# Patient Record
Sex: FEMALE | Race: WHITE | NOT HISPANIC OR LATINO | Employment: UNEMPLOYED | URBAN - METROPOLITAN AREA
[De-identification: names, ages, dates, MRNs, and addresses within clinical notes are randomized per-mention and may not be internally consistent; named-entity substitution may affect disease eponyms.]

---

## 2017-03-15 ENCOUNTER — TRANSFERRED RECORDS (OUTPATIENT)
Dept: HEALTH INFORMATION MANAGEMENT | Facility: CLINIC | Age: 42
End: 2017-03-15

## 2017-03-15 ENCOUNTER — OFFICE VISIT (OUTPATIENT)
Dept: OBGYN | Facility: CLINIC | Age: 42
End: 2017-03-15
Payer: COMMERCIAL

## 2017-03-15 VITALS
DIASTOLIC BLOOD PRESSURE: 66 MMHG | HEIGHT: 62 IN | BODY MASS INDEX: 26.87 KG/M2 | WEIGHT: 146 LBS | SYSTOLIC BLOOD PRESSURE: 100 MMHG | HEART RATE: 66 BPM

## 2017-03-15 DIAGNOSIS — Z13.6 CARDIOVASCULAR SCREENING; LDL GOAL LESS THAN 130: ICD-10-CM

## 2017-03-15 DIAGNOSIS — Z12.39 SCREENING FOR BREAST CANCER: ICD-10-CM

## 2017-03-15 DIAGNOSIS — Z13.1 SCREENING FOR DIABETES MELLITUS: ICD-10-CM

## 2017-03-15 DIAGNOSIS — Z01.419 ENCOUNTER FOR GYNECOLOGICAL EXAMINATION WITHOUT ABNORMAL FINDING: Primary | ICD-10-CM

## 2017-03-15 LAB
CHOLEST SERPL-MCNC: 213 MG/DL
GLUCOSE BLD-MCNC: 92 MG/DL (ref 70–99)
HDLC SERPL-MCNC: 71 MG/DL
LDLC SERPL CALC-MCNC: 133 MG/DL
NONHDLC SERPL-MCNC: 142 MG/DL
TRIGL SERPL-MCNC: 44 MG/DL

## 2017-03-15 PROCEDURE — 87624 HPV HI-RISK TYP POOLED RSLT: CPT | Performed by: PHYSICIAN ASSISTANT

## 2017-03-15 PROCEDURE — 99396 PREV VISIT EST AGE 40-64: CPT | Performed by: PHYSICIAN ASSISTANT

## 2017-03-15 PROCEDURE — G0145 SCR C/V CYTO,THINLAYER,RESCR: HCPCS | Performed by: PHYSICIAN ASSISTANT

## 2017-03-15 PROCEDURE — 36415 COLL VENOUS BLD VENIPUNCTURE: CPT | Performed by: PHYSICIAN ASSISTANT

## 2017-03-15 PROCEDURE — 82947 ASSAY GLUCOSE BLOOD QUANT: CPT | Performed by: PHYSICIAN ASSISTANT

## 2017-03-15 PROCEDURE — 80061 LIPID PANEL: CPT | Performed by: PHYSICIAN ASSISTANT

## 2017-03-15 ASSESSMENT — ANXIETY QUESTIONNAIRES
2. NOT BEING ABLE TO STOP OR CONTROL WORRYING: NOT AT ALL
GAD7 TOTAL SCORE: 0
1. FEELING NERVOUS, ANXIOUS, OR ON EDGE: NOT AT ALL
6. BECOMING EASILY ANNOYED OR IRRITABLE: NOT AT ALL
5. BEING SO RESTLESS THAT IT IS HARD TO SIT STILL: NOT AT ALL
3. WORRYING TOO MUCH ABOUT DIFFERENT THINGS: NOT AT ALL
7. FEELING AFRAID AS IF SOMETHING AWFUL MIGHT HAPPEN: NOT AT ALL

## 2017-03-15 ASSESSMENT — PATIENT HEALTH QUESTIONNAIRE - PHQ9: 5. POOR APPETITE OR OVEREATING: NOT AT ALL

## 2017-03-15 NOTE — MR AVS SNAPSHOT
After Visit Summary   3/15/2017    Khloe Smith    MRN: [id number]           Patient Information     Date Of Birth          1975        Visit Information        Provider Department      3/15/2017 9:50 AM Tatiana Gilliam PA-C [facility]        Today's Diagnoses     Encounter for gynecological examination without abnormal finding    -  1    Screening for breast cancer        CARDIOVASCULAR SCREENING; LDL GOAL LESS THAN 130        Screening for diabetes mellitus           Follow-ups after your visit        Additional Services     RADIOLOGY REFERRAL ([facility])       You have been referred to [facility] for Mammogram (Screening) Imaging.  They are located across the [place] from the [facility] (same building).  [address], [address]  Phone: [phone].                  Follow-up notes from your care team     Return in about 1 year (around 3/15/2018).      Who to contact     If you have questions or need follow up information about today's clinic visit or your schedule please contact [facility] directly at [phone].  Normal or non-critical lab and imaging results will be communicated to you by zeeWAVEShart, letter or phone within 4 business days after the clinic has received the results. If you do not hear from us within 7 days, please contact the clinic through zeeWAVEShart or phone. If you have a critical or abnormal lab result, we will notify you by phone as soon as possible.  Submit refill requests through Rubysophic or call your pharmacy and they will forward the refill request to us. Please allow 3 business days for your refill to be completed.          Additional Information About Your Visit        MyChart Information     Rubysophic gives you secure access to your electronic health record. If you see a primary care provider, you can also send messages to your care team and make appointments. If you have questions,  "please call your primary care clinic.  If you do not have a primary care provider, please call [phone] and they will assist you.        Care EveryWhere ID     This is your Care EveryWhere ID. This could be used by other organizations to access your [place] medical records  [id number]        Your Vitals Were     Pulse Height Last Period Breastfeeding? BMI (Body Mass Index)       66 5' 2\" (1.575 m) 03/06/2017 (Exact Date) No 26.7 kg/m2        Blood Pressure from Last 3 Encounters:   03/15/17 100/66   12/11/16 97/83   12/09/16 106/60    Weight from Last 3 Encounters:   03/15/17 146 lb (66.2 kg)   12/11/16 140 lb (63.5 kg)   12/09/16 142 lb (64.4 kg)              We Performed the Following     Glucose, whole blood     HPV High Risk Types DNA Cervical     Lipid panel reflex to direct LDL     Pap imaged thin layer screen with HPV - recommended age 30 - 65     RADIOLOGY REFERRAL ([facility])        Primary Care Provider Office Phone # Fax #    Jasson Barry [phone]       [place] [address]        Thank you!     Thank you for choosing [facility]  for your care. Our goal is always to provide you with excellent care. Hearing back from our patients is one way we can continue to improve our services. Please take a few minutes to complete the written survey that you may receive in the mail after your visit with us. Thank you!             Your Updated Medication List - Protect others around you: Learn how to safely use, store and throw away your medicines at www.disposemymeds.org.          This list is accurate as of: 3/15/17 10:32 AM.  Always use your most recent med list.                   Brand Name Dispense Instructions for use    metFORMIN 850 MG tablet    GLUCOPHAGE    180 tablet    Take 1 tablet (850 mg) by mouth 2 times daily (with meals)       MULTIPLE VITAMIN PO          VITAMIN B-12 PO            "

## 2017-03-15 NOTE — PROGRESS NOTES
Khloe is a 41 year old  female who presents for annual exam.     Besides routine health maintenance, she has no other health concerns today .    HPI:  The patient's PCP is Jasson Barry MD.      Doing well. Fasting today for lipids, last year LDL was slightly high. Has been back in a good exercise routine and eating healthier this year.     Having mammogram at [facility] after visit, ok coming here next year for mammogram.   Pap due this year.     Kids are all good: oldest is 16, twins are 14 and triplets are 11 yrs.     Periods regular, monthly.   No bladder issues.   Moods are generally good.     This past year, started having right sided numbness, leg and arm. Still has right lower leg numbness. Has had full work up by neurology and thus far, testing is negative for MS and no other etiologies have been found. Her symptoms have not progressed and are slowly improving. No pain or weakness, so does not really bother her. Will continue to see neurology every 4 months for time being.     GYNECOLOGIC HISTORY:    Patient's last menstrual period was 2017 (exact date).  Her current contraception method is: vasectomy.  She  reports that she has never smoked. She has never used smokeless tobacco.    Patient is sexually active.  STD testing offered?  Declined  Last PHQ-9 score on record =   PHQ-9 SCORE 3/15/2017   Total Score 0     Last GAD7 score on record =   DALIA-7 SCORE 3/15/2017   Total Score 0     Alcohol Score = 0    HEALTH MAINTENANCE:  Cholesterol: 3/14/16   Total= 221, Triglycerides=54, HDL=73, GPR=693, FBS=79 -patient is fasting  Last Mammo: 3/14/16, Result: normal, Next Mammo: today @ [facility]  Pap: 12 neg, HPV-  Colonoscopy: NA  Dexa:  never    Health maintenance updated:  yes    HISTORY:  Obstetric History       T1      TAB0   SAB1   E0   M2   L6       # Outcome Date GA Lbr Mckay/2nd Weight Sex Delivery Anes PTL Lv   4A  2006           4B             3A        "      3B  2003           3C  2003           2 Term 2001           1 SAB                   Patient Active Problem List   Diagnosis     PCOS (polycystic ovarian syndrome)     CARDIOVASCULAR SCREENING; LDL GOAL LESS THAN 130     Past Surgical History   Procedure Laterality Date     Pylorotomy       Appendectomy open        section  ,      Knee surgery Left 2009     Dilation and curettage        Social History   Substance Use Topics     Smoking status: Never Smoker     Smokeless tobacco: Never Used     Alcohol use No      Problem (# of Occurrences) Relation (Name,Age of Onset)    Colon Cancer (1) Paternal Grandfather    DIABETES (1) Mother    Hyperlipidemia (1) Father            Current Outpatient Prescriptions   Medication Sig     MULTIPLE VITAMIN PO      Cyanocobalamin (VITAMIN B-12 PO)      metFORMIN (GLUCOPHAGE) 850 MG tablet Take 1 tablet (850 mg) by mouth 2 times daily (with meals)     No current facility-administered medications for this visit.      Allergies   Allergen Reactions     Sulfa Drugs Rash       Past medical, surgical, social and family histories were reviewed and updated in EPIC.    ROS:   12 point review of systems negative other than symptoms noted below.    EXAM:  /66  Pulse 66  Ht 5' 2\" (1.575 m)  Wt 146 lb (66.2 kg)  LMP 2017 (Exact Date)  Breastfeeding? No  BMI 26.7 kg/m2   BMI: Body mass index is 26.7 kg/(m^2).    PHYSICAL EXAM:  Constitutional:  Appearance: Well nourished, well developed, alert, in no acute distress  Neck:  Lymph Nodes:  No lymphadenopathy present    Thyroid:  Gland size normal, nontender, no nodules or masses present  on palpation  Chest:  Respiratory Effort:  Breathing unlabored  Cardiovascular:    Heart: Auscultation:  Regular rate, normal rhythm, no murmurs present  Breasts: Inspection of Breasts:  No lymphadenopathy present    Palpation of Breasts and Axillae:  No masses present on palpation, no  breast " tenderness    Axillary Lymph Nodes:  No lymphadenopathy present  Gastrointestinal:   Abdominal Examination:  Abdomen nontender to palpation, tone normal without rigidity or guarding, no masses present, umbilicus without lesions   Liver and Spleen:  No hepatomegaly present, liver nontender to palpation    Hernias:  No hernias present  Lymphatic: Lymph Nodes:  No other lymphadenopathy present  Skin:  General Inspection:  No rashes present, no lesions present, no areas of  discoloration    Genitalia and Groin:  No rashes present, no lesions present, no areas of  discoloration, no masses present  Neurologic/Psychiatric:    Mental Status:  Oriented X3     Pelvic Exam:  External Genitalia:     Normal appearance for age, no discharge present, no tenderness present, no inflammatory lesions present, color normal  Vagina:     Normal vaginal vault without central or paravaginal defects, no discharge present, no inflammatory lesions present, no masses present  Bladder:     Nontender to palpation  Urethra:   Urethral Body:  Urethra palpation normal, urethra structural support normal   Urethral Meatus:  No erythema or lesions present  Cervix:     Appearance healthy, no lesions present, nontender to palpation, no bleeding present  Uterus:     Nontender to palpation, no masses present, position anteflexed, mobility: normal  Adnexa:     No adnexal tenderness present, no adnexal masses present  Perineum:     Perineum within normal limits, no evidence of trauma, no rashes or skin lesions present  Anus:     Anus within normal limits, no hemorrhoids present  Inguinal Lymph Nodes:     No lymphadenopathy present  Pubic Hair:     Normal pubic hair distribution for age  Genitalia and Groin:     No rashes present, no lesions present, no areas of discoloration, no masses present    COUNSELING:   Reviewed preventive health counseling, as reflected in patient instructions    BMI: Body mass index is 26.7 kg/(m^2).  Weight management plan:  Discussed healthy diet and exercise guidelines and patient will follow up in 12 months in clinic to re-evaluate.    ASSESSMENT:  41 year old female with satisfactory annual exam.    ICD-10-CM    1. Encounter for gynecological examination without abnormal finding Z01.419 Pap imaged thin layer screen with HPV - recommended age 30 - 65     HPV High Risk Types DNA Cervical   2. Screening for breast cancer Z12.39 RADIOLOGY REFERRAL ([facility])   3. CARDIOVASCULAR SCREENING; LDL GOAL LESS THAN 130 Z13.6 Lipid panel reflex to direct LDL   4. Screening for diabetes mellitus Z13.1 Glucose, whole blood       PLAN:  Follow up with your primary care provider for your other medical problems.  Continue self breast exam.  PHQ-9/DALIA-7 scores were discussed.  Alcohol score discussed.  Lab and pap smear results will be called to the patient.  Usual safety and preventative measures counseling done.      Tatiana Gilliam PA-C

## 2017-03-16 ASSESSMENT — ANXIETY QUESTIONNAIRES: GAD7 TOTAL SCORE: 0

## 2017-03-16 ASSESSMENT — PATIENT HEALTH QUESTIONNAIRE - PHQ9: SUM OF ALL RESPONSES TO PHQ QUESTIONS 1-9: 0

## 2017-03-18 LAB
COPATH REPORT: NORMAL
PAP: NORMAL

## 2017-03-21 LAB
FINAL DIAGNOSIS: NORMAL
HPV HR 12 DNA CVX QL NAA+PROBE: NEGATIVE
HPV16 DNA SPEC QL NAA+PROBE: NEGATIVE
HPV18 DNA SPEC QL NAA+PROBE: NEGATIVE
SPECIMEN DESCRIPTION: NORMAL

## 2018-03-06 DIAGNOSIS — E28.2 PCOS (POLYCYSTIC OVARIAN SYNDROME): ICD-10-CM

## 2018-03-06 DIAGNOSIS — Z01.419 ENCOUNTER FOR GYNECOLOGICAL EXAMINATION WITHOUT ABNORMAL FINDING: ICD-10-CM

## 2018-03-06 NOTE — TELEPHONE ENCOUNTER
metFORMIN (GLUCOPHAGE) 850 MG tablet     Last Written Prescription Date:  3/20/17  Last Fill Quantity: 180,   # refills: 3  Last Office Visit with [facility] primary care provider:  3/5/17  Future Office visit: 3/19/18    Routing refill request to provider for review/approval because:  Refill sent.

## 2018-03-19 ENCOUNTER — RADIANT APPOINTMENT (OUTPATIENT)
Dept: MAMMOGRAPHY | Facility: CLINIC | Age: 43
End: 2018-03-19
Payer: COMMERCIAL

## 2018-03-19 ENCOUNTER — OFFICE VISIT (OUTPATIENT)
Dept: OBGYN | Facility: CLINIC | Age: 43
End: 2018-03-19
Payer: COMMERCIAL

## 2018-03-19 VITALS
BODY MASS INDEX: 29.08 KG/M2 | HEIGHT: 62 IN | WEIGHT: 158 LBS | HEART RATE: 62 BPM | SYSTOLIC BLOOD PRESSURE: 104 MMHG | DIASTOLIC BLOOD PRESSURE: 62 MMHG

## 2018-03-19 DIAGNOSIS — E28.2 PCOS (POLYCYSTIC OVARIAN SYNDROME): ICD-10-CM

## 2018-03-19 DIAGNOSIS — Z12.31 VISIT FOR SCREENING MAMMOGRAM: ICD-10-CM

## 2018-03-19 DIAGNOSIS — Z01.419 ENCOUNTER FOR GYNECOLOGICAL EXAMINATION WITHOUT ABNORMAL FINDING: Primary | ICD-10-CM

## 2018-03-19 PROCEDURE — 99396 PREV VISIT EST AGE 40-64: CPT | Performed by: OBSTETRICS & GYNECOLOGY

## 2018-03-19 PROCEDURE — 77063 BREAST TOMOSYNTHESIS BI: CPT | Mod: TC

## 2018-03-19 PROCEDURE — 77067 SCR MAMMO BI INCL CAD: CPT | Mod: TC

## 2018-03-19 ASSESSMENT — ANXIETY QUESTIONNAIRES
3. WORRYING TOO MUCH ABOUT DIFFERENT THINGS: NOT AT ALL
6. BECOMING EASILY ANNOYED OR IRRITABLE: NOT AT ALL
1. FEELING NERVOUS, ANXIOUS, OR ON EDGE: NOT AT ALL
IF YOU CHECKED OFF ANY PROBLEMS ON THIS QUESTIONNAIRE, HOW DIFFICULT HAVE THESE PROBLEMS MADE IT FOR YOU TO DO YOUR WORK, TAKE CARE OF THINGS AT HOME, OR GET ALONG WITH OTHER PEOPLE: NOT DIFFICULT AT ALL
5. BEING SO RESTLESS THAT IT IS HARD TO SIT STILL: NOT AT ALL
7. FEELING AFRAID AS IF SOMETHING AWFUL MIGHT HAPPEN: NOT AT ALL
2. NOT BEING ABLE TO STOP OR CONTROL WORRYING: NOT AT ALL
GAD7 TOTAL SCORE: 0

## 2018-03-19 ASSESSMENT — PATIENT HEALTH QUESTIONNAIRE - PHQ9: 5. POOR APPETITE OR OVEREATING: NOT AT ALL

## 2018-03-19 NOTE — MR AVS SNAPSHOT
"              After Visit Summary   3/19/2018    Khloe Smith    MRN: [id number]           Patient Information     Date Of Birth          1975        Visit Information        Provider Department      3/19/2018 8:30 AM Martha Meek MD [facility]        Today's Diagnoses     Encounter for gynecological examination without abnormal finding    -  1    PCOS (polycystic ovarian syndrome)           Follow-ups after your visit        Who to contact     If you have questions or need follow up information about today's clinic visit or your schedule please contact [facility] directly at [phone].  Normal or non-critical lab and imaging results will be communicated to you by Wantreez Musichart, letter or phone within 4 business days after the clinic has received the results. If you do not hear from us within 7 days, please contact the clinic through WatchDoxt or phone. If you have a critical or abnormal lab result, we will notify you by phone as soon as possible.  Submit refill requests through Dragonfly or call your pharmacy and they will forward the refill request to us. Please allow 3 business days for your refill to be completed.          Additional Information About Your Visit        MyChart Information     Dragonfly gives you secure access to your electronic health record. If you see a primary care provider, you can also send messages to your care team and make appointments. If you have questions, please call your primary care clinic.  If you do not have a primary care provider, please call [phone] and they will assist you.        Care EveryWhere ID     This is your Care EveryWhere ID. This could be used by other organizations to access your [place] medical records  [id number]        Your Vitals Were     Pulse Height Last Period BMI (Body Mass Index)          62 5' 2\" (1.575 m) 03/14/2018 (Exact Date) 28.9 kg/m2         Blood Pressure from Last 3 Encounters:   03/19/18 " 104/62   03/15/17 100/66   12/11/16 97/83    Weight from Last 3 Encounters:   03/19/18 158 lb (71.7 kg)   03/15/17 146 lb (66.2 kg)   12/11/16 140 lb (63.5 kg)              Today, you had the following     No orders found for display         Today's Medication Changes          These changes are accurate as of 3/19/18  9:36 AM.  If you have any questions, ask your nurse or doctor.               These medicines have changed or have updated prescriptions.        Dose/Directions    metFORMIN 850 MG tablet   Commonly known as:  GLUCOPHAGE   This may have changed:  See the new instructions.   Used for:  PCOS (polycystic ovarian syndrome)   Changed by:  Martha Meek MD        TAKE 1 TABLET TWICE DAILY  WITH MEALS   Quantity:  180 tablet   Refills:  3            Where to get your medicines      These medications were sent to [facility] - [place], [place] - [address] AT [address] to [address], [facility] [address]     Phone:  [phone]     metFORMIN 850 MG tablet                Primary Care Provider Office Phone # Fax #    Jassonmanuel Barry [phone]       [place] [address]        Equal Access to Services     DANIAL TA AH: Hadii vita leon hadasho Sotomali, waaxda luqadaha, qaybta kaalmada adeegyada, waxay corina jones. So [facility] [phone].    ATENCIÓN: Si habla español, tiene a raines disposición servicios gratuitos de asistencia lingüística. Llame al [phone].    We comply with applicable federal civil rights laws and [place] laws. We do not discriminate on the basis of race, color, national origin, age, disability, sex, sexual orientation, or gender identity.            Thank you!     Thank you for choosing [facility]  for your care. Our goal is always to provide you with excellent care. Hearing back from our patients is one way we can continue to  improve our services. Please take a few minutes to complete the written survey that you may receive in the mail after your visit with us. Thank you!             Your Updated Medication List - Protect others around you: Learn how to safely use, store and throw away your medicines at www.disposemymeds.org.          This list is accurate as of 3/19/18  9:36 AM.  Always use your most recent med list.                   Brand Name Dispense Instructions for use Diagnosis    metFORMIN 850 MG tablet    GLUCOPHAGE    180 tablet    TAKE 1 TABLET TWICE DAILY  WITH MEALS    PCOS (polycystic ovarian syndrome)       MULTIPLE VITAMIN PO           VITAMIN B-12 PO

## 2018-03-19 NOTE — PROGRESS NOTES
"  Khloe is a 42 year old  female who presents for annual exam.     Besides routine health maintenance, she has no other health concerns today .    HPI:  The patient's PCP is Jasson Barry MD.    Patient is doing great. Does have Dr. Barry as her PCP but is mostly just seeing us    Has been on metformin for years with her PCOS. Periods were very irregular when got pregnant with her kids but now are actually more regular than they ever were. Will start with a day of bleeding and then have a few days without and then will start again. That is the most irregular part. The actual several days in a row of flow are fairly monthly.  Discussed with bhanu hernandes that maybe doesn't need the metformin and tried to wean. Got to one pill a day and instantly gained 10-15# without changing much else. So back to BID and tolerating it fine and now still working on getting that weight off. Exercising regularly and always has. \"not eating perfect but eat generally really healthy and rarely go out to eat\"    Patient is feeling fine herself but has an apple watch and every once in a while it will send her a signal that her heart rate was just really fast and couldn't detect what it was. She herself never feels that anything is abnormal in the moment. Isn't worried. Tolerating her exercise as she always did. No SOB or CP. Once every 3-4 weeks when at rest will feel her pulse for a few seconds but the watch never is alarming then. Not overly concerned but thought she'd mention it    Last lipids were last year. LDL slightly elevated but HDL is great    Oldest is 17 and a santana. Planning to go to the [employer] and got a great ACT score. Her twins are 15 years old and her triplets are 13. All do well in school. Play baseball casually and in band and really good kids.  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 Education is very important to her and happy that they're great students.      GYNECOLOGIC HISTORY:    Patient's last " menstrual period was 2018 (exact date).  Her current contraception method is: vasectomy.  She  reports that she has never smoked. She has never used smokeless tobacco.    Patient is sexually active.  STD testing offered?  Declined  Last PHQ-9 score on record =   PHQ-9 SCORE 3/19/2018   Total Score 0     Last GAD7 score on record =   DALIA-7 SCORE 3/19/2018   Total Score 0     Alcohol Score = 0    HEALTH MAINTENANCE:  Cholesterol: 03/15/17   Total= 213, Triglycerides=44, HDL=71, DQX=740, FBS=92  Last Mammo: 03/15/17, Result: normal, Next Mammo: today   Pap:   Lab Results   Component Value Date    PAP NIL, HPV- 03/15/2017      Colonoscopy:  Never, Result: not applicable  Dexa:  Never    Health maintenance updated:  yes    HISTORY:  Obstetric History       T2      L6     SAB1   TAB0   Ectopic0   Multiple2   Live Births0       # Outcome Date GA Lbr Mckay/2nd Weight Sex Delivery Anes PTL Lv   4A   33w4d   M       4B   33w4d   M       4C  06 33w4d   M       3A Term  37w0d   M       3B Term  37w0d   M       2 Term 2001    M       1 SAB                   Patient Active Problem List   Diagnosis     PCOS (polycystic ovarian syndrome)     CARDIOVASCULAR SCREENING; LDL GOAL LESS THAN 130     Past Surgical History:   Procedure Laterality Date     APPENDECTOMY OPEN        SECTION  ,      DILATION AND CURETTAGE       KNEE SURGERY Left 2009     PYLOROTOMY  1975      Social History   Substance Use Topics     Smoking status: Never Smoker     Smokeless tobacco: Never Used     Alcohol use No      Problem (# of Occurrences) Relation (Name,Age of Onset)    Colon Cancer (1) Paternal Grandfather    DIABETES (1) Mother    Hyperlipidemia (1) Father            Current Outpatient Prescriptions   Medication Sig     metFORMIN (GLUCOPHAGE) 850 MG tablet TAKE 1 TABLET TWICE DAILY  WITH MEALS     MULTIPLE VITAMIN PO      Cyanocobalamin (VITAMIN B-12 PO)       "[DISCONTINUED] metFORMIN (GLUCOPHAGE) 850 MG tablet TAKE 1 TABLET TWICE DAILY  WITH MEALS     No current facility-administered medications for this visit.      Allergies   Allergen Reactions     Sulfa Drugs Rash       Past medical, surgical, social and family histories were reviewed and updated in EPIC.    ROS:   12 point review of systems negative other than symptoms noted below.  Head: Sore Throat  Respiratory: Cough    EXAM:  /62  Pulse 62  Ht 5' 2\" (1.575 m)  Wt 158 lb (71.7 kg)  LMP 03/14/2018 (Exact Date)  BMI 28.9 kg/m2   BMI: Body mass index is 28.9 kg/(m^2).    PHYSICAL EXAM:  Constitutional:  Appearance: Well nourished, well developed, alert, in no acute distress  Neck:  Lymph Nodes:  No lymphadenopathy present    Thyroid:  Gland size normal, nontender, no nodules or masses present  on palpation  Chest:  Respiratory Effort:  Breathing unlabored  Cardiovascular:    Heart: Auscultation:  Regular rate, normal rhythm, no murmurs present  Breasts: Palpation of Breasts and Axillae:  No masses present on palpation, no breast tenderness. and No nodularity, asymmetry or nipple discharge bilaterally.  Gastrointestinal:   Abdominal Examination:  Abdomen nontender to palpation, tone normal without rigidity or guarding, no masses present, umbilicus without lesions   Liver and Spleen:  No hepatomegaly present, liver nontender to palpation    Hernias:  No hernias present  Lymphatic: Lymph Nodes:  No other lymphadenopathy present  Skin:  General Inspection:  No rashes present, no lesions present, no areas of  discoloration    Genitalia and Groin:  No rashes present, no lesions present, no areas of  discoloration, no masses present  Neurologic/Psychiatric:    Mental Status:  Oriented X3     Pelvic Exam:  External Genitalia:     Normal appearance for age, no discharge present, no tenderness present, no inflammatory lesions present, color normal  Vagina:     Normal vaginal vault without central or paravaginal " defects, no discharge present, no inflammatory lesions present, no masses present  Bladder:     Nontender to palpation  Urethra:   Urethral Body:  Urethra palpation normal, urethra structural support normal   Urethral Meatus:  No erythema or lesions present  Cervix:     Appearance healthy, no lesions present, nontender to palpation, no bleeding present  Uterus:     Uterus: firm, normal sized and nontender, anteverted in position.   Adnexa:     No adnexal tenderness present, no adnexal masses present  Perineum:     Perineum within normal limits, no evidence of trauma, no rashes or skin lesions present  Anus:     Anus within normal limits, no hemorrhoids present  Inguinal Lymph Nodes:     No lymphadenopathy present  Pubic Hair:     Normal pubic hair distribution for age  Genitalia and Groin:     No rashes present, no lesions present, no areas of discoloration, no masses present      COUNSELING:   Reviewed preventive health counseling, as reflected in patient instructions    BMI: Body mass index is 28.9 kg/(m^2).  Weight management plan: Discussed healthy diet and exercise guidelines and patient will follow up in 12 months in clinic to re-evaluate.    ASSESSMENT:  42 year old female with satisfactory annual exam.    ICD-10-CM    1. Encounter for gynecological examination without abnormal finding Z01.419    2. PCOS (polycystic ovarian syndrome) E28.2 metFORMIN (GLUCOPHAGE) 850 MG tablet       PLAN:  Pap is UTD for 4 more years  mammo was done today  Will plan to do fasting labs every 2 yrs unless has a lot of weight gain or other health changes. Should do fasting sugar/A1c at least that often if not yearly  Ok to cont on BID metformin for PCOS and DM prevention especially since feels it was helping with weight control. Some of the weight gain could also just be age  Discussed the apple watch is likely just picking up an occasional PVC. If she's not sx then wouldn't pursue it. If ever feels that it's racing or  palpiations would then refer for holter/event monitor or even just an EKG. Exam today with completely normal rate and rhythm    Martha Meek MD

## 2018-03-20 ASSESSMENT — PATIENT HEALTH QUESTIONNAIRE - PHQ9: SUM OF ALL RESPONSES TO PHQ QUESTIONS 1-9: 0

## 2018-03-20 ASSESSMENT — ANXIETY QUESTIONNAIRES: GAD7 TOTAL SCORE: 0

## 2018-08-27 DIAGNOSIS — E28.2 PCOS (POLYCYSTIC OVARIAN SYNDROME): ICD-10-CM

## 2018-08-27 DIAGNOSIS — Z01.419 ENCOUNTER FOR GYNECOLOGICAL EXAMINATION WITHOUT ABNORMAL FINDING: ICD-10-CM

## 2018-08-27 NOTE — TELEPHONE ENCOUNTER
"Requested Prescriptions   Pending Prescriptions Disp Refills     metFORMIN (GLUCOPHAGE) 850 MG tablet [Pharmacy Med Name: METFORMIN TAB 850MG] 180 tablet 0     Sig: TAKE 1 TABLET TWICE DAILY  WITH MEALS    Biguanide Agents Passed    8/27/2018 12:32 AM       Passed - Blood pressure less than 140/90 in past 6 months    BP Readings from Last 3 Encounters:   03/19/18 104/62   03/15/17 100/66   12/11/16 97/83                Passed - Patient is age 10 or older       Passed - Recent (12 mo) or future (30 days) visit within the authorizing provider's specialty     Patient had office visit in the last 12 months or has a visit in the next 30 days with authorizing provider or within the authorizing provider's specialty.  See \"Patient Info\" tab in inbasket, or \"Choose Columns\" in Meds & Orders section of the refill encounter.           Passed - Patient does NOT have a diagnosis of CHF.       Passed - Patient is not pregnant       Passed - Patient has not had a positive pregnancy test within the past 12 mos.         Last Written Prescription Date:  3/19/218  Last Fill Quantity: 180,  # refills: 3   Last office visit: 3/19/2018 with prescribing provider:  Dr. Martha Meek   Future Office Visit:  NONE    "

## 2019-01-15 DIAGNOSIS — Z01.419 ENCOUNTER FOR GYNECOLOGICAL EXAMINATION WITHOUT ABNORMAL FINDING: ICD-10-CM

## 2019-01-15 DIAGNOSIS — E28.2 PCOS (POLYCYSTIC OVARIAN SYNDROME): ICD-10-CM

## 2019-01-15 NOTE — TELEPHONE ENCOUNTER
"Requested Prescriptions   Pending Prescriptions Disp Refills     metFORMIN (GLUCOPHAGE) 850 MG tablet [Pharmacy Med Name: METFORMIN TAB 850MG] 180 tablet 0     Sig: TAKE 1 TABLET TWICE DAILY  WITH MEALS    Biguanide Agents Failed - 1/15/2019 10:40 AM       Failed - Blood pressure less than 140/90 in past 6 months    BP Readings from Last 3 Encounters:   03/19/18 104/62   03/15/17 100/66   12/11/16 97/83                Passed - Patient is age 10 or older       Passed - Recent (12 mo) or future (30 days) visit within the authorizing provider's specialty     Patient had office visit in the last 12 months or has a visit in the next 30 days with authorizing provider or within the authorizing provider's specialty.  See \"Patient Info\" tab in inbasket, or \"Choose Columns\" in Meds & Orders section of the refill encounter.             Passed - Patient does NOT have a diagnosis of CHF.       Passed - Medication is active on med list       Passed - Patient is not pregnant       Passed - Patient has not had a positive pregnancy test within the past 12 mos.       Last Written Prescription Date:  3/19/18  Last Fill Quantity: 180,  # refills: 3   Last office visit: 3/19/2018 with prescribing provider:  [place]   Future Office Visit:   Next 5 appointments (look out 90 days)    Mar 20, 2019  8:30 AM CDT  PHYSICAL with Martha Meek MD  [facility] [place] ([facility]) [address] [address]  [phone]      Rx sent for 90 days. Pt has appointment.   "

## 2019-05-15 NOTE — PROGRESS NOTES
Khloe is a 43 year old  female who presents for annual exam.     Besides routine health maintenance, she has no other health concerns today .    HPI:  The patient's PCP is  Jasson Barry MD.    Patient is overall doing great. Doesn't see Dr. Barry annually, mostly every other year or even every 3 b/c really has no medical needs.    Patient with long standing PCOS so has been taking metformin for years. Periods very irregular all of her young life, then got very regular for years after having the kids and now the last year or so is just a bit more irregular. Still mostly monthly but not right at 28-30 days. Will sometimes bleed for a day then stop for a few and then has her full periods. Not overly heavy or bad cramps. No vasomotor sx.    Has steadily gained weight this winter. Just wasn't active and eating more poorly. Trying to get out and be more active and walk more and trying to work on watching her food choices and declining sweets more, etc. Up 6# only from last year but 18# in 2 yrs. Can tell just by her clothes even though isn't one to weigh herself.    Oldest son is 18 and going to the [employer] next year for IT/comp sci.  Her twins are 16 and going to be juniors. And her triplets are 13. Everyone in sports and healthy, happy, doing well in school.     Had a hemorrhoid when pregnant but hasn't had issues in 10+ yrs. Now in the last year will occasionally feel a little bulge and it's sore and will bleed. Uses preparation H and usually resolves fairly quickly. Not overly worried about it as long as not abnormal. Will occasionally get a little more constipated with harder stools but most of the time is regular. If gets constipated will do a few days of probiotic and it seems to help.      GYNECOLOGIC HISTORY:    Patient's last menstrual period was 2019 (exact date).  Her current contraception method is: vasectomy.  She  reports that she has never smoked. She has never used smokeless  tobacco.    Patient is sexually active.  STD testing offered?  Declined  Last PHQ-9 score on record =   PHQ-9 SCORE 2019   PHQ-9 Total Score 0     Last GAD7 score on record =   DALIA-7 SCORE 2019   Total Score 0     Alcohol Score = 0    HEALTH MAINTENANCE:  Cholesterol: 3/15/2017   Total= 213, Triglycerides=44, HDL=71, YLL=324  Cholesterol   Date Value Ref Range Status   03/15/2017 213 (H) <200 mg/dL Final     Comment:     Desirable:       <200 mg/dl   2016 221 (H) <200 mg/dL Final     Comment:     Desirable:       <200 mg/dl      Last Mammo: 3/19/2018, Result: normal, Next Mammo: today   Pap: 3/15/2017 NIL, HPV-  Lab Results   Component Value Date    PAP NIL 03/15/2017      Colonoscopy:  NA, Result: not applicable, Next Colonoscopy: 7 years.  Dexa:  NA    Health maintenance updated:  yes    HISTORY:  OB History    Para Term  AB Living   4 3 2 1 1 6   SAB TAB Ectopic Multiple Live Births   1 0 0 2 6      # Outcome Date GA Lbr Mckay/2nd Weight Sex Delivery Anes PTL Lv   4A   33w4d   M       4B   33w4d   M       4C  06 33w4d   M       3A Term  37w0d   M       3B Term  37w0d   M       2 Term     M       1 SAB                Patient Active Problem List   Diagnosis     PCOS (polycystic ovarian syndrome)     CARDIOVASCULAR SCREENING; LDL GOAL LESS THAN 130     Past Surgical History:   Procedure Laterality Date     APPENDECTOMY OPEN        SECTION  ,      DILATION AND CURETTAGE       KNEE SURGERY Left 2009     PYLOROTOMY  1975      Social History     Tobacco Use     Smoking status: Never Smoker     Smokeless tobacco: Never Used   Substance Use Topics     Alcohol use: No     Alcohol/week: 0.0 oz      Problem (# of Occurrences) Relation (Name,Age of Onset)    Colon Cancer (1) Paternal Grandfather    Diabetes (1) Mother    Hyperlipidemia (1) Father            Current Outpatient Medications   Medication Sig     metFORMIN (GLUCOPHAGE)  "850 MG tablet TAKE 1 TABLET TWICE DAILY  WITH MEALS     MULTIPLE VITAMIN PO      No current facility-administered medications for this visit.      Allergies   Allergen Reactions     Sulfa Drugs Rash       Past medical, surgical, social and family histories were reviewed and updated in EPIC.    ROS:   12 point review of systems negative other than symptoms noted below.    EXAM:  /68   Pulse 74   Ht 1.575 m (5' 2\")   Wt 74.4 kg (164 lb)   LMP 04/20/2019 (Exact Date)   Breastfeeding? No   BMI 30.00 kg/m     BMI: Body mass index is 30 kg/m .    PHYSICAL EXAM:  Constitutional:  Appearance: Well nourished, well developed, alert, in no acute distress  Neck:  Lymph Nodes:  No lymphadenopathy present    Thyroid:  Gland size normal, nontender, no nodules or masses present  on palpation  Chest:  Respiratory Effort:  Breathing unlabored  Cardiovascular:    Heart: Auscultation:  Regular rate, normal rhythm, no murmurs present  Breasts: Palpation of Breasts and Axillae:  No masses present on palpation, no breast tenderness. and No nodularity, asymmetry or nipple discharge bilaterally.  Gastrointestinal:   Abdominal Examination:  Abdomen nontender to palpation, tone normal without rigidity or guarding, no masses present, umbilicus without lesions   Liver and Spleen:  No hepatomegaly present, liver nontender to palpation    Hernias:  No hernias present  Lymphatic: Lymph Nodes:  No other lymphadenopathy present  Skin:  General Inspection:  No rashes present, no lesions present, no areas of  discoloration    Genitalia and Groin:  No rashes present, no lesions present, no areas of  discoloration, no masses present  Neurologic/Psychiatric:    Mental Status:  Oriented X3     Pelvic Exam:  External Genitalia:     Normal appearance for age, no discharge present, no tenderness present, no inflammatory lesions present, color normal  Vagina:     Normal vaginal vault without central or paravaginal defects, no discharge present, no " inflammatory lesions present, no masses present  Bladder:     Nontender to palpation  Urethra:   Urethral Body:  Urethra palpation normal, urethra structural support normal   Urethral Meatus:  No erythema or lesions present  Cervix:     Appearance healthy, no lesions present, nontender to palpation, no bleeding present  Uterus:     Uterus: firm, normal sized and nontender, midplane in position.   Adnexa:     No adnexal tenderness present, no adnexal masses present  Perineum:     Perineum within normal limits, no evidence of trauma, no rashes or skin lesions present  Anus:     Anus within normal limits, no hemorrhoids present  Inguinal Lymph Nodes:     No lymphadenopathy present  Pubic Hair:     Normal pubic hair distribution for age  Genitalia and Groin:     No rashes present, no lesions present, no areas of discoloration, no masses present      COUNSELING:   Reviewed preventive health counseling, as reflected in patient instructions  Special attention given to:        Regular exercise       Healthy diet/nutrition       (Nisa)menopause management    BMI: Body mass index is 30 kg/m .  Weight management plan: Discussed healthy diet and exercise guidelines    ASSESSMENT:  43 year old female with satisfactory annual exam.    ICD-10-CM    1. Encounter for gynecological examination without abnormal finding Z01.419    2. PCOS (polycystic ovarian syndrome) E28.2 metFORMIN (GLUCOPHAGE) 850 MG tablet   3. Need for Tdap vaccination Z23 TDAP, IM (10 - 64 YRS) - Adacel     VACCINE ADMINISTRATION, INITIAL   4. External hemorrhoids K64.4        PLAN:  Pap is UTD for 3 more years  mammo today  Plan fasting labs with lipids, cmp and a1c next year  Continue metformin as helps with insulin sensitizing and PCOS and regulates her cycles. Refill sent for the year  Discussed weight gain, exercise, diet and harder time maintaining weight after age 40. Planning to focus on it more now and hoping to take some weight off in the next  year  Discussed avoiding constipation for hemorrhoids, prep H or tucks pads, fiber of 30-40g/day and adequate water intake. Can refer to CRS if persists or recurs.  Tdap today    Martha Meek MD  Screening Questionnaire for Adult Immunization    Are you sick today?   No   Do you have allergies to medications, food, a vaccine component or latex?   No   Have you ever had a serious reaction after receiving a vaccination?   No   Do you have a long-term health problem with heart disease, lung disease, asthma, kidney disease, metabolic disease (e.g. diabetes), anemia, or other blood disorder?   No   Do you have cancer, leukemia, HIV/AIDS, or any other immune system problem?   No   In the past 3 months, have you taken medications that affect  your immune system, such as prednisone, other steroids, or anticancer drugs; drugs for the treatment of rheumatoid arthritis, Crohn s disease, or psoriasis; or have you had radiation treatments?   No   Have you had a seizure, or a brain or other nervous system problem?   No   During the past year, have you received a transfusion of blood or blood     products, or been given immune (gamma) globulin or antiviral drug?   No   For women: Are you pregnant or is there a chance you could become        pregnant during the next month?   No   Have you received any vaccinations in the past 4 weeks?   No     Immunization questionnaire answers were all negative.        Per orders of Dr. Meek, injection of TDAP given by MAKAYLA GARCIA. Patient instructed to remain in clinic for 15 minutes afterwards, and to report any adverse reaction to me immediately.       Screening performed by MAKAYLA GARCIA on 5/17/2019 at 10:27 AM.

## 2019-05-17 ENCOUNTER — ANCILLARY PROCEDURE (OUTPATIENT)
Dept: MAMMOGRAPHY | Facility: CLINIC | Age: 44
End: 2019-05-17
Attending: OBSTETRICS & GYNECOLOGY
Payer: COMMERCIAL

## 2019-05-17 ENCOUNTER — OFFICE VISIT (OUTPATIENT)
Dept: OBGYN | Facility: CLINIC | Age: 44
End: 2019-05-17
Attending: OBSTETRICS & GYNECOLOGY
Payer: COMMERCIAL

## 2019-05-17 VITALS
HEART RATE: 74 BPM | DIASTOLIC BLOOD PRESSURE: 68 MMHG | HEIGHT: 62 IN | SYSTOLIC BLOOD PRESSURE: 110 MMHG | WEIGHT: 164 LBS | BODY MASS INDEX: 30.18 KG/M2

## 2019-05-17 DIAGNOSIS — K64.4 EXTERNAL HEMORRHOIDS: ICD-10-CM

## 2019-05-17 DIAGNOSIS — Z01.419 ENCOUNTER FOR GYNECOLOGICAL EXAMINATION WITHOUT ABNORMAL FINDING: Primary | ICD-10-CM

## 2019-05-17 DIAGNOSIS — Z12.31 VISIT FOR SCREENING MAMMOGRAM: ICD-10-CM

## 2019-05-17 DIAGNOSIS — Z23 NEED FOR TDAP VACCINATION: ICD-10-CM

## 2019-05-17 DIAGNOSIS — E28.2 PCOS (POLYCYSTIC OVARIAN SYNDROME): ICD-10-CM

## 2019-05-17 PROCEDURE — 77063 BREAST TOMOSYNTHESIS BI: CPT | Mod: TC

## 2019-05-17 PROCEDURE — 90715 TDAP VACCINE 7 YRS/> IM: CPT | Performed by: OBSTETRICS & GYNECOLOGY

## 2019-05-17 PROCEDURE — 77067 SCR MAMMO BI INCL CAD: CPT | Mod: TC

## 2019-05-17 PROCEDURE — 99396 PREV VISIT EST AGE 40-64: CPT | Mod: 25 | Performed by: OBSTETRICS & GYNECOLOGY

## 2019-05-17 PROCEDURE — 90471 IMMUNIZATION ADMIN: CPT | Performed by: OBSTETRICS & GYNECOLOGY

## 2019-05-17 ASSESSMENT — ANXIETY QUESTIONNAIRES
7. FEELING AFRAID AS IF SOMETHING AWFUL MIGHT HAPPEN: NOT AT ALL
5. BEING SO RESTLESS THAT IT IS HARD TO SIT STILL: NOT AT ALL
2. NOT BEING ABLE TO STOP OR CONTROL WORRYING: NOT AT ALL
6. BECOMING EASILY ANNOYED OR IRRITABLE: NOT AT ALL
3. WORRYING TOO MUCH ABOUT DIFFERENT THINGS: NOT AT ALL
IF YOU CHECKED OFF ANY PROBLEMS ON THIS QUESTIONNAIRE, HOW DIFFICULT HAVE THESE PROBLEMS MADE IT FOR YOU TO DO YOUR WORK, TAKE CARE OF THINGS AT HOME, OR GET ALONG WITH OTHER PEOPLE: NOT DIFFICULT AT ALL
1. FEELING NERVOUS, ANXIOUS, OR ON EDGE: NOT AT ALL
GAD7 TOTAL SCORE: 0

## 2019-05-17 ASSESSMENT — PATIENT HEALTH QUESTIONNAIRE - PHQ9
SUM OF ALL RESPONSES TO PHQ QUESTIONS 1-9: 0
5. POOR APPETITE OR OVEREATING: NOT AT ALL

## 2019-05-17 ASSESSMENT — MIFFLIN-ST. JEOR: SCORE: 1352.15

## 2019-05-18 ASSESSMENT — ANXIETY QUESTIONNAIRES: GAD7 TOTAL SCORE: 0

## 2019-09-29 ENCOUNTER — HEALTH MAINTENANCE LETTER (OUTPATIENT)
Age: 44
End: 2019-09-29

## 2020-05-15 DIAGNOSIS — E28.2 PCOS (POLYCYSTIC OVARIAN SYNDROME): ICD-10-CM

## 2020-05-17 NOTE — TELEPHONE ENCOUNTER
"Requested Prescriptions   Pending Prescriptions Disp Refills     metFORMIN (GLUCOPHAGE) 850 MG tablet [Pharmacy Med Name: METFORMIN TAB 850MG] 180 tablet 3     Sig: TAKE 1 TABLET TWICE DAILY  WITH MEALS       Biguanide Agents Passed - 5/15/2020 10:12 PM        Passed - Patient is age 10 or older        Passed - Recent (12 mo) or future (30 days) visit within the authorizing provider's specialty      Patient has had an office visit with the authorizing provider or a provider within the authorizing providers department within the previous 12 mos or has a future within next 30 days. See \"Patient Info\" tab in inbasket, or \"Choose Columns\" in Meds & Orders section of the refill encounter.              Passed - Patient does NOT have a diagnosis of CHF.        Passed - Medication is active on med list        Passed - Patient is not pregnant        Passed - Patient has not had a positive pregnancy test within the past 12 mos.            Last Written Prescription Date:  5/17/19  Last Fill Quantity: 180,  # refills: 3   Last office visit: 5/17/2019 with prescribing provider:  Dr Meek   Future Office Visit:    Prescription approved per [facility] Refill Protocol.  Vianney Mancilla RN on 5/17/2020 at 2:14 PM          "

## 2020-08-04 DIAGNOSIS — E28.2 PCOS (POLYCYSTIC OVARIAN SYNDROME): ICD-10-CM

## 2020-08-04 NOTE — TELEPHONE ENCOUNTER
"Requested Prescriptions   Pending Prescriptions Disp Refills     metFORMIN (GLUCOPHAGE) 850 MG tablet [Pharmacy Med Name: METFORMIN TAB 850MG] 180 tablet 0     Sig: TAKE 1 TABLET TWICE DAILY  WITH MEALS (NO FURTHER     REFILLS UNTIL SEEN FOR     ANNUAL)       Biguanide Agents Failed - 8/4/2020  1:50 AM        Failed - Recent (12 mo) or future (30 days) visit within the authorizing provider's specialty      Patient has had an office visit with the authorizing provider or a provider within the authorizing providers department within the previous 12 mos or has a future within next 30 days. See \"Patient Info\" tab in inbasket, or \"Choose Columns\" in Meds & Orders section of the refill encounter.              Passed - Patient is age 10 or older        Passed - Patient does NOT have a diagnosis of CHF.        Passed - Medication is active on med list        Passed - Patient is not pregnant        Passed - Patient has not had a positive pregnancy test within the past 12 mos.            One month sent. Appointment needed for further refills  Alexia Monson RN on 8/4/2020 at 8:14 AM    "

## 2020-08-23 DIAGNOSIS — E28.2 PCOS (POLYCYSTIC OVARIAN SYNDROME): ICD-10-CM

## 2020-08-24 NOTE — TELEPHONE ENCOUNTER
"Requested Prescriptions   Pending Prescriptions Disp Refills     metFORMIN (GLUCOPHAGE) 850 MG tablet [Pharmacy Med Name: METFORMIN TAB 850MG] 60 tablet 0     Sig: TAKE 1 TABLET TWICE DAILY  WITH MEALS (NO FURTHER     REFILLS UNTIL SEEN FOR     ANNUAL)       Biguanide Agents Failed - 8/23/2020  8:21 AM        Failed - Recent (12 mo) or future (30 days) visit within the authorizing provider's specialty      Patient has had an office visit with the authorizing provider or a provider within the authorizing providers department within the previous 12 mos or has a future within next 30 days. See \"Patient Info\" tab in inbasket, or \"Choose Columns\" in Meds & Orders section of the refill encounter.              Passed - Patient is age 10 or older        Passed - Patient does NOT have a diagnosis of CHF.        Passed - Medication is active on med list        Passed - Patient is not pregnant        Passed - Patient has not had a positive pregnancy test within the past 12 mos.            Prescription approved per [facility] Refill Protocol.  Next 5 appointments (look out 90 days)    Nov 04, 2020  8:30 AM CST  PHYSICAL with Martha Meek MD  [facility] [place] ([facility]) [address] [address]  [phone]        Alexia Monson RN on 8/24/2020 at 8:50 AM    "

## 2020-09-24 DIAGNOSIS — E28.2 PCOS (POLYCYSTIC OVARIAN SYNDROME): ICD-10-CM

## 2020-09-24 NOTE — TELEPHONE ENCOUNTER
"Requested Prescriptions   Pending Prescriptions Disp Refills     metFORMIN (GLUCOPHAGE) 850 MG tablet [Pharmacy Med Name: METFORMIN TAB 850MG] 60 tablet 0     Sig: TAKE 1 TABLET TWICE DAILY  WITH MEALS (NO FURTHER     REFILLS UNTIL SEEN FOR     ANNUAL)       Biguanide Agents Failed - 9/24/2020  6:20 AM        Failed - Recent (12 mo) or future (30 days) visit within the authorizing provider's specialty      Patient has had an office visit with the authorizing provider or a provider within the authorizing providers department within the previous 12 mos or has a future within next 30 days. See \"Patient Info\" tab in inbasket, or \"Choose Columns\" in Meds & Orders section of the refill encounter.              Passed - Patient is age 10 or older        Passed - Patient does NOT have a diagnosis of CHF.        Passed - Medication is active on med list        Passed - Patient is not pregnant        Passed - Patient has not had a positive pregnancy test within the past 12 mos.            Last Written Prescription Date:  8/24/20  Last Fill Quantity: 60,  # refills: 0   Last office visit: 5/17/2019 with prescribing provider:  [place]   Future Office Visit:   Next 5 appointments (look out 90 days)    Nov 04, 2020  8:30 AM CST  PHYSICAL with Martha Meek MD  [facility] [place] ([facility]) [address] [place] [address] [phone]         Medication is being filled for 1 time refill only due to:  Patient needs to be seen because it has been more than one year since last visit.  Appointment scheduled.  Nieves Bowen RN on 9/24/2020 at 8:27 AM          "

## 2020-10-18 DIAGNOSIS — E28.2 PCOS (POLYCYSTIC OVARIAN SYNDROME): ICD-10-CM

## 2020-10-19 NOTE — TELEPHONE ENCOUNTER
"Requested Prescriptions   Pending Prescriptions Disp Refills     metFORMIN (GLUCOPHAGE) 850 MG tablet [Pharmacy Med Name: METFORMIN TAB 850MG] 60 tablet 0     Sig: TAKE 1 TABLET TWICE DAILY  WITH MEALS (NO FURTHER     REFILLS UNTIL SEEN FOR     ANNUAL)       Biguanide Agents Passed - 10/18/2020  7:15 AM        Passed - Patient is age 10 or older        Passed - Recent (12 mo) or future (30 days) visit within the authorizing provider's specialty      Patient has had an office visit with the authorizing provider or a provider within the authorizing providers department within the previous 12 mos or has a future within next 30 days. See \"Patient Info\" tab in inbasket, or \"Choose Columns\" in Meds & Orders section of the refill encounter.              Passed - Patient does NOT have a diagnosis of CHF.        Passed - Medication is active on med list        Passed - Patient is not pregnant        Passed - Patient has not had a positive pregnancy test within the past 12 mos.            Last Written Prescription Date:  9/24/20  Last Fill Quantity: 60,  # refills: 0   Last office visit: 5/17/2019 with prescribing provider:  Dr. Meek     Future Office Visit:   Next 5 appointments (look out 90 days)    Nov 04, 2020  8:30 AM  PHYSICAL with Martha Meek MD  [facility] [place] ([facility] [place]) [address] [place] [address] [phone]                 "

## 2020-10-19 NOTE — TELEPHONE ENCOUNTER
Prescription approved per [facility] [place]ill Protocol.  Vianney Mancilla RN on 10/19/2020 at 10:21 AM

## 2020-11-03 NOTE — PROGRESS NOTES
Khloe is a 45 year old  female who presents for annual exam.     Besides routine health maintenance, she has no other health concerns today.    HPI:  The patient's PCP is Dr. Jasson Barry MD.      Patient is doing really well. Periods are about 30-35 days, not overly heavy, no vasomotor sx at all. If anything is cold more often. No concerns. Not contracepting due to h.o PCOS and needing fertility treatments.    Fasting for labs today. All normal 3/17 other than very slightly high LDL of 133. Does metformin and has for years for PCOS as it controlled her cycles mostly.     Had migraines years ago, mostly after her triplets was very frequent. Now hasn't had any in probably 2 yrs    mammo today. Due for colonoscopy    Oldest is at the [facility] [employer] and mostly remote so comes home a lot. Her twins are seniors, doing all DL and both just got accepted to the [employer] as well. Her triplets are 14/15 and also doing DL. All play baseball but was cancelled this year. Hoping they get to do it in spring but also happy to not have to worry about risks.    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 Supposed to have gone to [place] this year as her parents go every winter for 2 months but all of them cancelled going.      GYNECOLOGIC HISTORY:    Patient's last menstrual period was 10/20/2020 (within weeks).    Regular menses? yes  Menses every 30-35 days.  Length of menses: 5 days    Her current contraception method is: none.  She  reports that she has never smoked. She has never used smokeless tobacco.    Patient is sexually active.  STD testing offered?  Declined     Last PHQ-9 score on record =   PHQ-9 SCORE 2020   PHQ-9 Total Score 0     Last GAD7 score on record =   DALIA-7 SCORE 2020   Total Score 0     Alcohol Score = 0    HEALTH MAINTENANCE:  Cholesterol:   Recent Labs   Lab Test 03/15/17  1027 16  0959   CHOL 213*  221*   HDL 71 73   * 137*   TRIG 44 54   FBS 92    TSH 16 = 2.09       Last Mammo: One year ago, Result: Normal, Next Mammo: Today   Pap:   Lab Results   Component Value Date    PAP NIL NEG-HPV 03/15/2017      Colonoscopy: N/A, Next Colonoscopy: Due   Dexa: N/A    Health maintenance updated:  yes    HISTORY:  OB History    Para Term  AB Living   4 3 2 1 1 6   SAB TAB Ectopic Multiple Live Births   1 0 0 2 6      # Outcome Date GA Lbr Mckay/2nd Weight Sex Delivery Anes PTL Lv   4A  2006 33w4d   M       4B   33w4d   M       4C  06 33w4d   M       3A Term  37w0d   M       3B Term  37w0d   M       2 Term     M       1 SAB                Patient Active Problem List   Diagnosis     PCOS (polycystic ovarian syndrome)     CARDIOVASCULAR SCREENING; LDL GOAL LESS THAN 130     Past Surgical History:   Procedure Laterality Date     APPENDECTOMY OPEN        SECTION  ,      DILATION AND CURETTAGE       KNEE SURGERY Left 2009     PYLOROTOMY  1975      Social History     Tobacco Use     Smoking status: Never Smoker     Smokeless tobacco: Never Used   Substance Use Topics     Alcohol use: No     Alcohol/week: 0.0 standard drinks      Problem (# of Occurrences) Relation (Name,Age of Onset)    Colon Cancer (1) Paternal Grandfather    Diabetes (1) Mother    Hyperlipidemia (1) Father            Current Outpatient Medications   Medication Sig     metFORMIN (GLUCOPHAGE) 850 MG tablet Take 1 tablet (850 mg) by mouth 2 times daily (with meals) NO FURTHER REFILLS UNTIL SEEN FOR ANNUAL     MULTIPLE VITAMIN PO      Probiotic Product (PROBIOTIC-10) CHEW      No current facility-administered medications for this visit.      Allergies   Allergen Reactions     Sulfa Drugs Rash       Past medical, surgical, social and family histories were reviewed and updated in EPIC.    ROS:   12 point review of systems negative other than symptoms noted below or in the  "HPI.  No urinary frequency or dysuria, bladder or kidney problems, Normal menstrual cycles    EXAM:  /66   Ht 1.575 m (5' 2\")   Wt 76.7 kg (169 lb)   LMP 10/20/2020 (Within Weeks)   BMI 30.91 kg/m     BMI: Body mass index is 30.91 kg/m .    PHYSICAL EXAM:  Constitutional:   Appearance: Well nourished, well developed, alert, in no acute distress  Neck:  Lymph Nodes:  No lymphadenopathy present    Thyroid:  Gland size normal, nontender, no nodules or masses present  on palpation  Chest:  Respiratory Effort:  Breathing unlabored  Cardiovascular:    Heart: Auscultation:  Regular rate, normal rhythm, no murmurs present  Breasts: Palpation of Breasts and Axillae:  No masses present on palpation, no breast tenderness., Axillary Lymph Nodes:  No lymphadenopathy present. and No nodularity, asymmetry or nipple discharge bilaterally.  Gastrointestinal:   Abdominal Examination:  Abdomen nontender to palpation, tone normal without rigidity or guarding, no masses present, umbilicus without lesions   Liver and Spleen:  No hepatomegaly present, liver nontender to palpation    Hernias:  No hernias present  Lymphatic: Lymph Nodes:  No other lymphadenopathy present  Skin:  General Inspection:  No rashes present, no lesions present, no areas of  discoloration  Neurologic:    Mental Status:  Oriented X3.  Normal strength and tone, sensory exam                grossly normal, mentation intact and speech normal.    Psychiatric:   Mentation appears normal and affect normal/bright.         Pelvic Exam:  External Genitalia:     Normal appearance for age, no discharge present, no tenderness present, no inflammatory lesions present, color normal  Vagina:     Normal vaginal vault without central or paravaginal defects, no discharge present, no inflammatory lesions present, no masses present  Bladder:     Nontender to palpation  Urethra:   Urethral Body:  Urethra palpation normal, urethra structural support normal   Urethral Meatus:  No " erythema or lesions present  Cervix:     Appearance healthy, no lesions present, nontender to palpation, no bleeding present  Uterus:     Uterus: firm, normal sized and nontender, anteverted in position.   Adnexa:     No adnexal tenderness present, no adnexal masses present  Perineum:     Perineum within normal limits, no evidence of trauma, no rashes or skin lesions present  Anus:     Anus within normal limits, no hemorrhoids present  Inguinal Lymph Nodes:     No lymphadenopathy present  Pubic Hair:     Normal pubic hair distribution for age  Genitalia and Groin:     No rashes present, no lesions present, no areas of discoloration, no masses present      COUNSELING:   Reviewed preventive health counseling, as reflected in patient instructions  Special attention given to:        Regular exercise       Healthy diet/nutrition       (Nisa)menopause management    BMI: Body mass index is 30.91 kg/m .  Weight management plan: Discussed healthy diet and exercise guidelines    ASSESSMENT:  45 year old female with satisfactory annual exam.    ICD-10-CM    1. Encounter for gynecological examination without abnormal finding  Z01.419    2. PCOS (polycystic ovarian syndrome)  E28.2 metFORMIN (GLUCOPHAGE) 850 MG tablet   3. BMI 30.0-30.9,adult  Z68.30    4. Encounter for lipid screening for cardiovascular disease  Z13.220 Lipid panel reflex to direct LDL Fasting    Z13.6    5. Screening for metabolic disorder  Z13.228 Comprehensive metabolic panel   6. Screen for colon cancer  Z12.11 GASTROENTEROLOGY ADULT REF PROCEDURE ONLY   7. Screening for diabetes mellitus  Z13.1 Hemoglobin A1c   8. Screening for thyroid disorder  Z13.29 TSH with free T4 reflex   9. Vitamin D deficiency  E55.9 Vitamin D Deficiency       PLAN:  Pap is UTD for 1.5 more years  mammo today  Colonoscopy referral sent  Screening labs today  Refill on metformin sent for PCOS    Martha Meek MD

## 2020-11-04 ENCOUNTER — OFFICE VISIT (OUTPATIENT)
Dept: OBGYN | Facility: CLINIC | Age: 45
End: 2020-11-04
Payer: COMMERCIAL

## 2020-11-04 ENCOUNTER — ANCILLARY PROCEDURE (OUTPATIENT)
Dept: MAMMOGRAPHY | Facility: CLINIC | Age: 45
End: 2020-11-04
Payer: COMMERCIAL

## 2020-11-04 VITALS
WEIGHT: 169 LBS | SYSTOLIC BLOOD PRESSURE: 112 MMHG | BODY MASS INDEX: 31.1 KG/M2 | HEIGHT: 62 IN | DIASTOLIC BLOOD PRESSURE: 66 MMHG

## 2020-11-04 DIAGNOSIS — E55.9 VITAMIN D DEFICIENCY: ICD-10-CM

## 2020-11-04 DIAGNOSIS — E28.2 PCOS (POLYCYSTIC OVARIAN SYNDROME): ICD-10-CM

## 2020-11-04 DIAGNOSIS — Z13.220 ENCOUNTER FOR LIPID SCREENING FOR CARDIOVASCULAR DISEASE: ICD-10-CM

## 2020-11-04 DIAGNOSIS — Z12.11 SCREEN FOR COLON CANCER: ICD-10-CM

## 2020-11-04 DIAGNOSIS — Z13.1 SCREENING FOR DIABETES MELLITUS: ICD-10-CM

## 2020-11-04 DIAGNOSIS — Z12.31 VISIT FOR SCREENING MAMMOGRAM: ICD-10-CM

## 2020-11-04 DIAGNOSIS — Z01.419 ENCOUNTER FOR GYNECOLOGICAL EXAMINATION WITHOUT ABNORMAL FINDING: Primary | ICD-10-CM

## 2020-11-04 DIAGNOSIS — Z13.6 ENCOUNTER FOR LIPID SCREENING FOR CARDIOVASCULAR DISEASE: ICD-10-CM

## 2020-11-04 DIAGNOSIS — Z13.29 SCREENING FOR THYROID DISORDER: ICD-10-CM

## 2020-11-04 DIAGNOSIS — Z13.228 SCREENING FOR METABOLIC DISORDER: ICD-10-CM

## 2020-11-04 LAB
ALBUMIN SERPL-MCNC: 4 G/DL (ref 3.4–5)
ALP SERPL-CCNC: 60 U/L (ref 40–150)
ALT SERPL W P-5'-P-CCNC: 20 U/L (ref 0–50)
ANION GAP SERPL CALCULATED.3IONS-SCNC: 6 MMOL/L (ref 3–14)
AST SERPL W P-5'-P-CCNC: 16 U/L (ref 0–45)
BILIRUB SERPL-MCNC: 0.4 MG/DL (ref 0.2–1.3)
BUN SERPL-MCNC: 13 MG/DL (ref 7–30)
CALCIUM SERPL-MCNC: 9.1 MG/DL (ref 8.5–10.1)
CHLORIDE SERPL-SCNC: 105 MMOL/L (ref 94–109)
CHOLEST SERPL-MCNC: 227 MG/DL
CO2 SERPL-SCNC: 26 MMOL/L (ref 20–32)
CREAT SERPL-MCNC: 0.56 MG/DL (ref 0.52–1.04)
GFR SERPL CREATININE-BSD FRML MDRD: >90 ML/MIN/{1.73_M2}
GLUCOSE SERPL-MCNC: 90 MG/DL (ref 70–99)
HBA1C MFR BLD: 4.9 % (ref 0–5.6)
HDLC SERPL-MCNC: 73 MG/DL
LDLC SERPL CALC-MCNC: 139 MG/DL
NONHDLC SERPL-MCNC: 154 MG/DL
POTASSIUM SERPL-SCNC: 3.7 MMOL/L (ref 3.4–5.3)
PROT SERPL-MCNC: 7.6 G/DL (ref 6.8–8.8)
SODIUM SERPL-SCNC: 137 MMOL/L (ref 133–144)
TRIGL SERPL-MCNC: 73 MG/DL
TSH SERPL DL<=0.005 MIU/L-ACNC: 2.45 MU/L (ref 0.4–4)

## 2020-11-04 PROCEDURE — 77067 SCR MAMMO BI INCL CAD: CPT | Performed by: RADIOLOGY

## 2020-11-04 PROCEDURE — 83036 HEMOGLOBIN GLYCOSYLATED A1C: CPT | Performed by: OBSTETRICS & GYNECOLOGY

## 2020-11-04 PROCEDURE — 36415 COLL VENOUS BLD VENIPUNCTURE: CPT | Performed by: OBSTETRICS & GYNECOLOGY

## 2020-11-04 PROCEDURE — 82306 VITAMIN D 25 HYDROXY: CPT | Performed by: OBSTETRICS & GYNECOLOGY

## 2020-11-04 PROCEDURE — 77063 BREAST TOMOSYNTHESIS BI: CPT | Performed by: RADIOLOGY

## 2020-11-04 PROCEDURE — 99396 PREV VISIT EST AGE 40-64: CPT | Performed by: OBSTETRICS & GYNECOLOGY

## 2020-11-04 PROCEDURE — 84443 ASSAY THYROID STIM HORMONE: CPT | Performed by: OBSTETRICS & GYNECOLOGY

## 2020-11-04 PROCEDURE — 80061 LIPID PANEL: CPT | Performed by: OBSTETRICS & GYNECOLOGY

## 2020-11-04 PROCEDURE — 80053 COMPREHEN METABOLIC PANEL: CPT | Performed by: OBSTETRICS & GYNECOLOGY

## 2020-11-04 ASSESSMENT — ANXIETY QUESTIONNAIRES
5. BEING SO RESTLESS THAT IT IS HARD TO SIT STILL: NOT AT ALL
2. NOT BEING ABLE TO STOP OR CONTROL WORRYING: NOT AT ALL
7. FEELING AFRAID AS IF SOMETHING AWFUL MIGHT HAPPEN: NOT AT ALL
IF YOU CHECKED OFF ANY PROBLEMS ON THIS QUESTIONNAIRE, HOW DIFFICULT HAVE THESE PROBLEMS MADE IT FOR YOU TO DO YOUR WORK, TAKE CARE OF THINGS AT HOME, OR GET ALONG WITH OTHER PEOPLE: NOT DIFFICULT AT ALL
GAD7 TOTAL SCORE: 0
1. FEELING NERVOUS, ANXIOUS, OR ON EDGE: NOT AT ALL
6. BECOMING EASILY ANNOYED OR IRRITABLE: NOT AT ALL
3. WORRYING TOO MUCH ABOUT DIFFERENT THINGS: NOT AT ALL

## 2020-11-04 ASSESSMENT — MIFFLIN-ST. JEOR: SCORE: 1364.83

## 2020-11-04 ASSESSMENT — PATIENT HEALTH QUESTIONNAIRE - PHQ9
SUM OF ALL RESPONSES TO PHQ QUESTIONS 1-9: 0
5. POOR APPETITE OR OVEREATING: NOT AT ALL

## 2020-11-05 LAB — DEPRECATED CALCIDIOL+CALCIFEROL SERPL-MC: 45 UG/L (ref 20–75)

## 2020-11-05 ASSESSMENT — ANXIETY QUESTIONNAIRES: GAD7 TOTAL SCORE: 0

## 2020-11-17 DIAGNOSIS — E28.2 PCOS (POLYCYSTIC OVARIAN SYNDROME): ICD-10-CM

## 2021-01-14 ENCOUNTER — HEALTH MAINTENANCE LETTER (OUTPATIENT)
Age: 46
End: 2021-01-14

## 2021-04-11 DIAGNOSIS — Z11.59 ENCOUNTER FOR SCREENING FOR OTHER VIRAL DISEASES: ICD-10-CM

## 2021-04-22 DIAGNOSIS — Z11.59 ENCOUNTER FOR SCREENING FOR OTHER VIRAL DISEASES: ICD-10-CM

## 2021-04-22 LAB
LABORATORY COMMENT REPORT: NORMAL
SARS-COV-2 RNA RESP QL NAA+PROBE: NEGATIVE
SARS-COV-2 RNA RESP QL NAA+PROBE: NORMAL
SPECIMEN SOURCE: NORMAL
SPECIMEN SOURCE: NORMAL

## 2021-04-22 PROCEDURE — U0005 INFEC AGEN DETEC AMPLI PROBE: HCPCS | Performed by: COLON & RECTAL SURGERY

## 2021-04-22 PROCEDURE — U0003 INFECTIOUS AGENT DETECTION BY NUCLEIC ACID (DNA OR RNA); SEVERE ACUTE RESPIRATORY SYNDROME CORONAVIRUS 2 (SARS-COV-2) (CORONAVIRUS DISEASE [COVID-19]), AMPLIFIED PROBE TECHNIQUE, MAKING USE OF HIGH THROUGHPUT TECHNOLOGIES AS DESCRIBED BY CMS-2020-01-R: HCPCS | Performed by: COLON & RECTAL SURGERY

## 2021-04-26 ENCOUNTER — HOSPITAL ENCOUNTER (OUTPATIENT)
Facility: CLINIC | Age: 46
Discharge: HOME | End: 2021-04-26
Attending: COLON & RECTAL SURGERY | Admitting: COLON & RECTAL SURGERY
Payer: COMMERCIAL

## 2021-04-26 VITALS
HEIGHT: 62 IN | SYSTOLIC BLOOD PRESSURE: 109 MMHG | HEART RATE: 78 BPM | BODY MASS INDEX: 29.44 KG/M2 | WEIGHT: 160 LBS | OXYGEN SATURATION: 97 % | DIASTOLIC BLOOD PRESSURE: 74 MMHG | TEMPERATURE: 98.6 F | RESPIRATION RATE: 15 BRPM

## 2021-04-26 LAB — COLONOSCOPY: NORMAL

## 2021-04-26 PROCEDURE — 45380 COLONOSCOPY AND BIOPSY: CPT | Performed by: COLON & RECTAL SURGERY

## 2021-04-26 PROCEDURE — 272N000105 HC DEVICE CLIP QUICK: Performed by: COLON & RECTAL SURGERY

## 2021-04-26 PROCEDURE — 88305 TISSUE EXAM BY PATHOLOGIST: CPT | Mod: 26 | Performed by: PATHOLOGY

## 2021-04-26 PROCEDURE — G0500 MOD SEDAT ENDO SERVICE >5YRS: HCPCS | Performed by: COLON & RECTAL SURGERY

## 2021-04-26 PROCEDURE — 88305 TISSUE EXAM BY PATHOLOGIST: CPT | Mod: TC | Performed by: COLON & RECTAL SURGERY

## 2021-04-26 PROCEDURE — 99153 MOD SED SAME PHYS/QHP EA: CPT

## 2021-04-26 PROCEDURE — 45385 COLONOSCOPY W/LESION REMOVAL: CPT | Performed by: COLON & RECTAL SURGERY

## 2021-04-26 PROCEDURE — 45382 COLONOSCOPY W/CONTROL BLEED: CPT | Performed by: COLON & RECTAL SURGERY

## 2021-04-26 PROCEDURE — 250N000011 HC RX IP 250 OP 636: Performed by: COLON & RECTAL SURGERY

## 2021-04-26 PROCEDURE — 999N000099 HC STATISTIC MODERATE SEDATION < 10 MIN: Performed by: COLON & RECTAL SURGERY

## 2021-04-26 RX ORDER — LIDOCAINE 40 MG/G
CREAM TOPICAL
Status: DISCONTINUED | OUTPATIENT
Start: 2021-04-26 | End: 2021-04-26 | Stop reason: HOSPADM

## 2021-04-26 RX ORDER — ONDANSETRON 2 MG/ML
4 INJECTION INTRAMUSCULAR; INTRAVENOUS
Status: DISCONTINUED | OUTPATIENT
Start: 2021-04-26 | End: 2021-04-26 | Stop reason: HOSPADM

## 2021-04-26 RX ORDER — FENTANYL CITRATE 50 UG/ML
INJECTION, SOLUTION INTRAMUSCULAR; INTRAVENOUS PRN
Status: COMPLETED | OUTPATIENT
Start: 2021-04-26 | End: 2021-04-26

## 2021-04-26 RX ADMIN — FENTANYL CITRATE 100 MCG: 50 INJECTION, SOLUTION INTRAMUSCULAR; INTRAVENOUS at 09:18

## 2021-04-26 RX ADMIN — MIDAZOLAM 2 MG: 1 INJECTION INTRAMUSCULAR; INTRAVENOUS at 09:18

## 2021-04-26 ASSESSMENT — MIFFLIN-ST. JEOR: SCORE: 1324.01

## 2021-04-26 NOTE — H&P
Colon & Rectal Surgery History and Physical  Pre-Endoscopy Procedure Note    History of Present Illness   I have been asked by Dr. Meek to evaluate this 45 year old female for colorectal cancer screening. She currently denies any abdominal pain, weight loss, bleeding per rectum, or recent change in bowel habits.    Past Medical History  Diagnosis Date     Migraines     mostly after triplets and quite a lot and then rare (10.20)     Polycystic ovaries        Past Surgical History  Procedure Laterality Date     APPENDECTOMY OPEN        SECTION  ,      DILATION AND CURETTAGE       KNEE SURGERY Left 2009     PYLOROTOMY          Medications  Medication Sig     metFORMIN (GLUCOPHAGE) 850 MG tablet Take 1 tablet (850 mg) by mouth 2 times daily (with meals)      MULTIPLE VITAMIN PO      Probiotic Product (PROBIOTIC-10) CHEW        Allergies  Allergen Reactions     Sulfa Drugs Rash        Family History   Family history includes Colon Cancer in her paternal grandfather; Diabetes in her mother; Hyperlipidemia in her father.     Social History    She reports that she has never smoked. She has never used smokeless tobacco. She reports that she does not drink alcohol or use drugs.    Review of Systems   Constitutional:  No fever, weight change or fatigue.    Eyes:     No dry eyes or vision changes.   Ears/Nose/Throat/Neck:  No oral ulcers, sore throat or voice change.    Cardiovascular:   No palpitations, syncope, angina or edema.   Respiratory:    No chest pain, excessive sleepiness, shortness of breath or hemoptysis.    Gastrointestinal:   No abdominal pain, nausea, vomiting, diarrhea or heartburn.    Genitourinary:   No dysuria, hematuria, urinary retention or urinary frequency.   Musculoskeletal:  No joint swelling or arthralgias.    Dermatologic:  No skin rash or other skin changes.   Neurologic:    No focal weakness or numbness. No neuropathy.   Psychiatric:    No depression, anxiety, suicidal  "ideation, or paranoid ideation.   Endocrine:   No cold or heat intolerance, polydipsia, hirsutism, change in libido, or flushing.   Hematology/Lymphatic:  No bleeding or lymphadenopathy.    Allergy/Immunology:  No rhinitis or hives.     Physical Exam   Vitals:  /68, HR 64, RR 12, temperature 98.6  F (37  C), height 1.575 m (5' 2\"), weight 72.6 kg (160 lb), SpO2 97 %, not currently breastfeeding.    General:  Alert and oriented to person, place and time   Airway: Normal oropharyngeal airway and neck mobility   Lungs:  Clear bilaterally   Heart:  Regular rate and rhythm   Abdomen: Soft, NT, ND, no masses   Extremities: Warm, good capillary refill    ASA Grade: II (mild systemic disease)    Impression: Cleared for use of conscious sedation for colorectal cancer screening    Plan: Proceed with colonoscopy     Mervat Cuba MD  [facility]  [phone]  "

## 2021-04-27 LAB — COPATH REPORT: NORMAL

## 2021-07-06 DIAGNOSIS — E28.2 PCOS (POLYCYSTIC OVARIAN SYNDROME): ICD-10-CM

## 2021-07-06 NOTE — TELEPHONE ENCOUNTER
Prescription approved per [facility] Refill Protocol.  Nieves Bowen RN on 7/6/2021 at 12:31 PM

## 2021-07-06 NOTE — TELEPHONE ENCOUNTER
"Requested Prescriptions   Pending Prescriptions Disp Refills     metFORMIN (GLUCOPHAGE) 850 MG tablet [Pharmacy Med Name: METFORMIN TAB 850MG] 90 tablet 4     Sig: TAKE 1 TABLET TWICE A DAY  WITH MEALS       Biguanide Agents Passed - 7/6/2021  7:16 AM        Passed - Patient is age 10 or older        Passed - Recent (12 mo) or future (30 days) visit within the authorizing provider's specialty      Patient has had an office visit with the authorizing provider or a provider within the authorizing providers department within the previous 12 mos or has a future within next 30 days. See \"Patient Info\" tab in inbasket, or \"Choose Columns\" in Meds & Orders section of the refill encounter.              Passed - Patient does NOT have a diagnosis of CHF.        Passed - Medication is active on med list        Passed - Patient is not pregnant        Passed - Patient has not had a positive pregnancy test within the past 12 mos.            Last Written Prescription Date:  11/4/20  Last Fill Quantity: 90,  # refills: 4   Last office visit: 11/4/2020 with prescribing provider:  [place]   Future Office Visit:  none          "

## 2021-10-23 ENCOUNTER — HEALTH MAINTENANCE LETTER (OUTPATIENT)
Age: 46
End: 2021-10-23

## 2021-11-05 ENCOUNTER — ANCILLARY PROCEDURE (OUTPATIENT)
Dept: MAMMOGRAPHY | Facility: CLINIC | Age: 46
End: 2021-11-05
Payer: COMMERCIAL

## 2021-11-05 DIAGNOSIS — Z12.31 VISIT FOR SCREENING MAMMOGRAM: ICD-10-CM

## 2021-11-05 PROCEDURE — 77063 BREAST TOMOSYNTHESIS BI: CPT | Mod: TC | Performed by: RADIOLOGY

## 2021-11-05 PROCEDURE — 77067 SCR MAMMO BI INCL CAD: CPT | Mod: TC | Performed by: RADIOLOGY

## 2021-12-13 DIAGNOSIS — E28.2 PCOS (POLYCYSTIC OVARIAN SYNDROME): ICD-10-CM

## 2021-12-13 NOTE — TELEPHONE ENCOUNTER
Requested Prescriptions   Pending Prescriptions Disp Refills     metFORMIN (GLUCOPHAGE) 850 MG tablet 30 tablet 0     Sig: TAKE 1 TABLET TWICE A DAY  WITH MEALS       There is no refill protocol information for this order        Last Written Prescription Date:  7/6/21  Last Fill Quantity: 90,  # refills: 3   Last office visit: 11/4/2020 with prescribing provider:  Dr Meek   Future Office Visit:   Next 5 appointments (look out 90 days)    Jan 03, 2022 11:00 AM  PHYSICAL with Martha Meek MD  [facility] ([facility] ) [address] [id number] [phone]

## 2021-12-13 NOTE — TELEPHONE ENCOUNTER
Medication is being filled for 1 time refill only due to:  Patient needs to be seen because it has been more than one year since last visit.  Appointment scheduled  Nieves Bowen RN on 12/13/2021 at 2:10 PM

## 2021-12-18 ENCOUNTER — HEALTH MAINTENANCE LETTER (OUTPATIENT)
Age: 46
End: 2021-12-18

## 2021-12-30 NOTE — PROGRESS NOTES
Khloe is a 46 year old  female who presents for annual exam.     Besides routine health maintenance, she has no other health concerns today .    HPI:  The patient's PCP is Jasson Barry MD.      Patient is doing overall well but covid has not been good to her weight. Just more sedentary and not going places and consolidating errands, snacking more and more night time eating and carbs, etc.  Both her and her sister just came to a point where they realize they have to get serious so hoping to start working out more regularly and start to meal plan and snack less and limit carbs and processed foods, etc    Not fasting today. Has PCOS so has been on metformin for years for that. Periods were irregular her whole life and needed meds to conceive but have been very regular the last 10+ yrs since having kids. Her A1C was 4.9 last year. LDL was slightly high at 139. No jeff DM every dx'd. No overtly strong fhx of CVD in early ages.    Periods regular, no VM sx, mild PMS but nothing signficant.    3 kids in college and her triplets are sophomores in . All three older ones are at the [employer]. All have covid shots and boosters but her oldest got influenza and then they all had it. All of them mild to moderately sick for 3 days and fine.    Had her mammo in / had her appointment with me but got her period so cancelled. Wondering about consolidating. Had her c.s  and had a polyp so have recommended 5 yr emre      GYNECOLOGIC HISTORY:    Patient's last menstrual period was 2021 (exact date).    Regular menses? yes  Menses every 28-30 days.  Length of menses: 4-5 days    Her current contraception method is: vasectomy.  She  reports that she has never smoked. She has never used smokeless tobacco.    Patient is sexually active.  STD testing offered?  Declined  Last PHQ-9 score on record =   PHQ-9 SCORE 1/3/2022   PHQ-9 Total Score 0     Last GAD7 score on record =   DALIA-7 SCORE 1/3/2022   Total  Score 0     Alcohol Score = 0    HEALTH MAINTENANCE:  Cholesterol:  Recent Labs   Lab Test 20  0849 03/15/17  1027   CHOL 227* 213*   HDL 73 71   * 133*   TRIG 73 44   Last Mammo: 2021, Result: Normal, Next Mammo: 2022  Pap:   Lab Results   Component Value Date    PAP NIL, HPV- 03/15/2017     Colonoscopy:  2021, Result: Polyps, Next Colonoscopy: 4 years.  Dexa:  N/a    Health maintenance updated:  yes    HISTORY:  OB History    Para Term  AB Living   4 3 2 1 1 6   SAB IAB Ectopic Multiple Live Births   1 0 0 2 6      # Outcome Date GA Lbr Mckay/2nd Weight Sex Delivery Anes PTL Lv   4A   33w4d   M       4B   33w4d   M       4C  06 33w4d   M       3A Term  37w0d   M       3B Term  37w0d   M       2 Term     M       1 SAB                Patient Active Problem List   Diagnosis     PCOS (polycystic ovarian syndrome)     CARDIOVASCULAR SCREENING; LDL GOAL LESS THAN 130     Past Surgical History:   Procedure Laterality Date     APPENDECTOMY OPEN  1993      SECTION  ,      COLONOSCOPY N/A 2021    Procedure: Colonoscopy, With Polypectomy And Biopsy;  Surgeon: Mervat Cuba MD;  Location: [facility]     DILATION AND CURETTAGE       KNEE SURGERY Left 2009     PYLOROTOMY  1975      Social History     Tobacco Use     Smoking status: Never Smoker     Smokeless tobacco: Never Used   Substance Use Topics     Alcohol use: No     Alcohol/week: 0.0 standard drinks      Problem (# of Occurrences) Relation (Name,Age of Onset)    Colon Cancer (1) Paternal Grandfather    Diabetes (1) Mother    Hyperlipidemia (1) Father            Current Outpatient Medications   Medication Sig     metFORMIN (GLUCOPHAGE) 850 MG tablet TAKE 1 TABLET TWICE A DAY  WITH MEALS     MULTIPLE VITAMIN PO      naproxen sodium 220 MG capsule      Probiotic Product (PROBIOTIC-10) CHEW      No current facility-administered medications for this visit.  "    Allergies   Allergen Reactions     Sulfa Drugs Rash       Past medical, surgical, social and family histories were reviewed and updated in EPIC.    ROS:   12 point review of systems negative other than symptoms noted below or in the HPI.  No urinary frequency or dysuria, bladder or kidney problems, Normal menstrual cycles    EXAM:  /62   Pulse 74   Ht 1.556 m (5' 1.25\")   Wt 78.7 kg (173 lb 9.6 oz)   LMP 12/30/2021 (Exact Date)   Breastfeeding No   BMI 32.53 kg/m     BMI: Body mass index is 32.53 kg/m .    PHYSICAL EXAM:  Constitutional:   Appearance: Well nourished, well developed, alert, in no acute distress  Neck:  Lymph Nodes:  No lymphadenopathy present    Thyroid:  Gland size normal, nontender, no nodules or masses present  on palpation  Chest:  Respiratory Effort:  Breathing unlabored, CTA bilaterally  Cardiovascular:    Heart: Auscultation:  Regular rate, normal rhythm, no murmurs present  Breasts: Palpation of Breasts and Axillae:  No masses present on palpation, no breast tenderness., Axillary Lymph Nodes:  No lymphadenopathy present. and No nodularity, asymmetry or nipple discharge bilaterally.  Gastrointestinal:   Abdominal Examination:  Abdomen nontender to palpation, tone normal without rigidity or guarding, no masses present, umbilicus without lesions   Liver and Spleen:  No hepatomegaly present, liver nontender to palpation    Hernias:  No hernias present  Lymphatic: Lymph Nodes:  No other lymphadenopathy present  Skin:  General Inspection:  No rashes present, no lesions present, no areas of  discoloration  Neurologic:    Mental Status:  Oriented X3.  Normal strength and tone, sensory exam                grossly normal, mentation intact and speech normal.    Psychiatric:   Mentation appears normal and affect normal/bright.         Pelvic Exam:  External Genitalia:     Normal appearance for age, no discharge present, no tenderness present, no inflammatory lesions present, color " normal  Vagina:     Normal vaginal vault without central or paravaginal defects, no discharge present, LIGHT TO MODERATE DARK BLOOD NOTED,  no inflammatory lesions present, no masses present  Bladder:     Nontender to palpation  Urethra:   Urethral Body:  Urethra palpation normal, urethra structural support normal   Urethral Meatus:  No erythema or lesions present  Cervix:     Appearance healthy, no lesions present, nontender to palpation,   Uterus:     Uterus: firm, normal sized and nontender, anteverted in position.   Adnexa:     No adnexal tenderness present, no adnexal masses present  Perineum:     Perineum within normal limits, no evidence of trauma, no rashes or skin lesions present  Anus:     Anus within normal limits, no hemorrhoids present  Inguinal Lymph Nodes:     No lymphadenopathy present  Pubic Hair:     Normal pubic hair distribution for age  Genitalia and Groin:     No rashes present, no lesions present, no areas of discoloration, no masses present      COUNSELING:   Reviewed preventive health counseling, as reflected in patient instructions  Special attention given to:        Regular exercise       Healthy diet/nutrition       (Nisa)menopause management    BMI: Body mass index is 32.53 kg/m .  Weight management plan: Discussed healthy diet and exercise guidelines    ASSESSMENT:  46 year old female with satisfactory annual exam.    ICD-10-CM    1. Encounter for gynecological examination without abnormal finding  Z01.419 Pap thin layer screen with HPV - recommended age 30 - 65 years   2. PCOS (polycystic ovarian syndrome)  E28.2 metFORMIN (GLUCOPHAGE) 850 MG tablet     Hemoglobin A1c   3. Screening for cardiovascular condition  Z13.6 Lipid panel reflex to direct LDL Fasting   4. Screening for metabolic disorder  Z13.228 Comprehensive metabolic panel   5. Encounter for vitamin deficiency screening  Z13.21 Vitamin D Deficiency   6. Screening for thyroid disorder  Z13.29 TSH with free T4 reflex   7. BMI  32.0-32.9,adult  Z68.32        PLAN:  Pap/HPV done today  mammo from November was normal. Ok to push her mammo a month or so next year and sync it back up with her visits with me  Plan fasting labs next year and due to PCOS on metformin is higher risk for DM but also won't likely be able to officially dx it very early b/c being on her metformin will likely control her fasting glucose and A1C better than if she wasn't on it.  Regardless reviewed diet/exercise/metabolic slowing at perimenopause/etc  Refill metformin 850mg BID and doing well on that w/o GI side effects and remembering the second dose of the day w/o issue    Martha Meek MD

## 2022-01-03 ENCOUNTER — OFFICE VISIT (OUTPATIENT)
Dept: OBGYN | Facility: CLINIC | Age: 47
End: 2022-01-03
Payer: COMMERCIAL

## 2022-01-03 VITALS
SYSTOLIC BLOOD PRESSURE: 112 MMHG | HEART RATE: 74 BPM | WEIGHT: 173.6 LBS | BODY MASS INDEX: 32.77 KG/M2 | DIASTOLIC BLOOD PRESSURE: 62 MMHG | HEIGHT: 61 IN

## 2022-01-03 DIAGNOSIS — Z13.228 SCREENING FOR METABOLIC DISORDER: ICD-10-CM

## 2022-01-03 DIAGNOSIS — E28.2 PCOS (POLYCYSTIC OVARIAN SYNDROME): ICD-10-CM

## 2022-01-03 DIAGNOSIS — Z13.6 SCREENING FOR CARDIOVASCULAR CONDITION: ICD-10-CM

## 2022-01-03 DIAGNOSIS — Z13.29 SCREENING FOR THYROID DISORDER: ICD-10-CM

## 2022-01-03 DIAGNOSIS — Z01.419 ENCOUNTER FOR GYNECOLOGICAL EXAMINATION WITHOUT ABNORMAL FINDING: Primary | ICD-10-CM

## 2022-01-03 DIAGNOSIS — Z13.21 ENCOUNTER FOR VITAMIN DEFICIENCY SCREENING: ICD-10-CM

## 2022-01-03 PROCEDURE — 99396 PREV VISIT EST AGE 40-64: CPT | Performed by: OBSTETRICS & GYNECOLOGY

## 2022-01-03 PROCEDURE — G0145 SCR C/V CYTO,THINLAYER,RESCR: HCPCS | Performed by: OBSTETRICS & GYNECOLOGY

## 2022-01-03 PROCEDURE — 87624 HPV HI-RISK TYP POOLED RSLT: CPT | Performed by: OBSTETRICS & GYNECOLOGY

## 2022-01-03 RX ORDER — COVID-19 ANTIGEN TEST
KIT MISCELLANEOUS
COMMUNITY

## 2022-01-03 ASSESSMENT — ANXIETY QUESTIONNAIRES
5. BEING SO RESTLESS THAT IT IS HARD TO SIT STILL: NOT AT ALL
7. FEELING AFRAID AS IF SOMETHING AWFUL MIGHT HAPPEN: NOT AT ALL
IF YOU CHECKED OFF ANY PROBLEMS ON THIS QUESTIONNAIRE, HOW DIFFICULT HAVE THESE PROBLEMS MADE IT FOR YOU TO DO YOUR WORK, TAKE CARE OF THINGS AT HOME, OR GET ALONG WITH OTHER PEOPLE: NOT DIFFICULT AT ALL
GAD7 TOTAL SCORE: 0
3. WORRYING TOO MUCH ABOUT DIFFERENT THINGS: NOT AT ALL
1. FEELING NERVOUS, ANXIOUS, OR ON EDGE: NOT AT ALL
2. NOT BEING ABLE TO STOP OR CONTROL WORRYING: NOT AT ALL
6. BECOMING EASILY ANNOYED OR IRRITABLE: NOT AT ALL

## 2022-01-03 ASSESSMENT — PATIENT HEALTH QUESTIONNAIRE - PHQ9
5. POOR APPETITE OR OVEREATING: NOT AT ALL
SUM OF ALL RESPONSES TO PHQ QUESTIONS 1-9: 0

## 2022-01-03 ASSESSMENT — MIFFLIN-ST. JEOR: SCORE: 1368.78

## 2022-01-04 ASSESSMENT — ANXIETY QUESTIONNAIRES: GAD7 TOTAL SCORE: 0

## 2022-01-05 LAB
BKR LAB AP GYN ADEQUACY: NORMAL
BKR LAB AP GYN INTERPRETATION: NORMAL
BKR LAB AP GYN OTHER FINDINGS: NORMAL
BKR LAB AP HPV REFLEX: NORMAL
BKR LAB AP PREVIOUS ABNORMAL: NORMAL
PATH REPORT.COMMENTS IMP SPEC: NORMAL
PATH REPORT.COMMENTS IMP SPEC: NORMAL
PATH REPORT.RELEVANT HX SPEC: NORMAL

## 2022-01-06 LAB
HUMAN PAPILLOMA VIRUS 16 DNA: NEGATIVE
HUMAN PAPILLOMA VIRUS 18 DNA: NEGATIVE
HUMAN PAPILLOMA VIRUS FINAL DIAGNOSIS: NORMAL
HUMAN PAPILLOMA VIRUS OTHER HR: NEGATIVE

## 2022-06-10 NOTE — PROGRESS NOTES
Khloe Smith is a 46 year old female who is being evaluated via a billable telephone visit.      What phone number would you like to be contacted at? [phone]  How would you like to obtain your AVS? Sachin    SUBJECTIVE:                                                   Khloe Smith is a 46 year old female who presents for virtual visit today for the following health issue(s):  Patient presents with:  Consult: Pt reports headaches 2-3 days before menses. Ongoing since January. Taking ibuprofen and this typically helps, denies any vision changes with headaches. Periods are typically monthly, pt states she missed her cycle in June. Last period was May 2nd.    HPI:  Patient reported testing positive for Covid-19 on June 5th and was experiencing shortness of breath during that time along with other typical covid sx     Although patient says that her menstrual cycles have become more normal and regular in the last few years after most of her younger years being very irregular, she is still taking 850 mg Metformin initially Rx'd as part of her PCOS mgmt when trying to conceive. Has no s.e from it and so has just stayed on it to avoid going back to irregular and anovulatory cycles.     Patient notes that her headaches now are different from her headaches in the past as she also has a history of migraines with light sensitivity. She says the pain of her headaches this time feel more dull around the head with no apparent light sensitivity. Patient is currently taking 3 ibuprofen for headache relief which does work but wanted to make sure she's not missing something else that she should be doing or other work up in case something else is wrong. Can handle this as long as it's not dangerous.  Doesn't think it's more significant HAs since recovering from covid but just had it so hasn't had a cycle this month to know for sure if it impacts her menstrual HAs     Patient says she is not experiencing any hot flashes  of night sweats or any other significant perimenopausal symptoms besides the headaches that are throbbing     Patient's last menstrual period was 2022.    Patient is sexually active, .  Using vasectomy for contraception.    reports that she has never smoked. She has never used smokeless tobacco.    Health maintenance updated:  yes    Today's PHQ-2 Score:   PHQ-2 (  [employer]) 1/3/2022   Q1: Little interest or pleasure in doing things 0   Q2: Feeling down, depressed or hopeless 0   PHQ-2 Score 0   PHQ-2 Total Score (12-17 Years)- Positive if 3 or more points; Administer PHQ-A if positive -   Q1: Little interest or pleasure in doing things -   Q2: Feeling down, depressed or hopeless -   PHQ-2 Score -     Today's PHQ-9 Score:   PHQ-9 SCORE 1/3/2022   PHQ-9 Total Score 0     Today's DALIA-7 Score:   DALIA-7 SCORE 1/3/2022   Total Score 0       Problem list and histories reviewed & adjusted, as indicated.  Additional history: as documented.    Patient Active Problem List   Diagnosis     PCOS (polycystic ovarian syndrome)     Menstrual migraine without status migrainosus, not intractable     Past Surgical History:   Procedure Laterality Date     APPENDECTOMY OPEN        SECTION  ,      COLONOSCOPY N/A 2021    Procedure: Colonoscopy, With Polypectomy And Biopsy;  Surgeon: Mervat Cuba MD;  Location: [facility]     DILATION AND CURETTAGE       KNEE SURGERY Left 2009     PYLOROTOMY  1975      Social History     Tobacco Use     Smoking status: Never Smoker     Smokeless tobacco: Never Used   Substance Use Topics     Alcohol use: No     Alcohol/week: 0.0 standard drinks      Problem (# of Occurrences) Relation (Name,Age of Onset)    Colon Cancer (1) Paternal Grandfather    Diabetes (1) Mother    Hyperlipidemia (1) Father            Current Outpatient Medications   Medication Sig     metFORMIN (GLUCOPHAGE) 850 MG tablet TAKE 1 TABLET TWICE A DAY  WITH MEALS     MULTIPLE VITAMIN PO       naproxen sodium 220 MG capsule      Probiotic Product (PROBIOTIC-10) CHEW      No current facility-administered medications for this visit.     Allergies   Allergen Reactions     Sulfa Drugs Rash         OBJECTIVE:     No vitals were obtained today due to virtual visit.    Physical Exam    NEURO:   Mentation and speech appropriate for age.  PSYCH: Mentation appears normal, affect normal/bright, judgement and insight intact, normal speech      ASSESSMENT/PLAN:                                                      Phone call duration: 20 minutes, along with 3 minutes of chart review and completion, on the same DOS, for a total of 23 minutes      ICD-10-CM    1. Menstrual migraine without status migrainosus, not intractable  G43.829         Instructed patient to take 800 mg of Advil with caffeine for headaches instead of the Aleve as it has not been working for headache relief but advil has been better when she's tried it.   If the 800 mg Advil does not work, Excedrin migraine can be taken instead, and reviewed typical max NSAID dosing in general.     Headaches seem to be premenstrual fairly exclusively and reviewed the physiology and hormonal basis of this as perimenopausal age is reached and low E2 levels before menses can cause this. Reassured that given that very predictable and cyclic nature other imaging and testing are not needed but if HAs worsen or progress to other times or are refractory to OTC meds, then may discuss other options.    Patient is reassured by this and declines any hormonal interventions at this time as long as this seems like a typical and common thing at this age, which it is.  Did discuss ocps/N.R in a continuous fashion to help with the E2 drop in luteal phase as another potential option.  She will consider this but for now is reassured to know why it's happening and will try the otc options as above.   .     Martha Meek MD  [facility] [place]

## 2022-06-13 ENCOUNTER — VIRTUAL VISIT (OUTPATIENT)
Dept: OBGYN | Facility: CLINIC | Age: 47
End: 2022-06-13
Payer: COMMERCIAL

## 2022-06-13 DIAGNOSIS — G43.829 MENSTRUAL MIGRAINE WITHOUT STATUS MIGRAINOSUS, NOT INTRACTABLE: Primary | ICD-10-CM

## 2022-06-13 PROCEDURE — 99213 OFFICE O/P EST LOW 20 MIN: CPT | Mod: TEL | Performed by: OBSTETRICS & GYNECOLOGY

## 2022-06-29 ENCOUNTER — MYC MEDICAL ADVICE (OUTPATIENT)
Dept: OBGYN | Facility: CLINIC | Age: 47
End: 2022-06-29

## 2022-08-16 PROBLEM — G43.829 MENSTRUAL MIGRAINE WITHOUT STATUS MIGRAINOSUS, NOT INTRACTABLE: Status: ACTIVE | Noted: 2022-08-16

## 2022-08-16 PROBLEM — Z13.6 CARDIOVASCULAR SCREENING; LDL GOAL LESS THAN 130: Status: RESOLVED | Noted: 2017-03-15 | Resolved: 2022-08-16

## 2022-10-10 ENCOUNTER — HEALTH MAINTENANCE LETTER (OUTPATIENT)
Age: 47
End: 2022-10-10

## 2023-01-02 NOTE — PROGRESS NOTES
Khloe is a 47 year old  female who presents for annual exam.     Besides routine health maintenance, she has no other health concerns today .    HPI:  The patient's PCP is Dr. Jasson Barry MD.      Patient is being seen today for her annual physical exam.     Has lost about 7# since here last year. Says that she had joined a program at her 's work where she was tracking her food and her calories.  Actually lost 15# but gained back  8# from the holidays. However first time she's successfully been able to lose in a long time so is going to continue with it. Did have covid over thxgivigin so between being more run down and sedentary and holiday cooking/eating, was feeling on a good path.    Still taking her metformin 2x a day. No s.e from it and has been doing it mostly for h.o PCOS and lifelong irregular periods before having her kids. Wondering if her weight would go up if she stops taking metformin or what other sx she may have, b/c wondering if really needs it at this point as she's had very regular periods ever since she had her triplets      However when she got covid in November she got her oct cycle and then skipped it while sick and hasn't had one since. Not having any v.m sx of any kind.  has a vas for their contraception.      Has several sibs so her 2 older sisters  are about 10 years older then her. One had a really easy transition and one much harder and still having a lot of v.m sx all the time and not wanting to do HRT so just dealing with it.     Is fasting today and would like to do her labs.    Mother has diabetes but is not using insulin or meds and just managing with diet    All 6 boys were home for holidays. Her younger 3 all have girlfriends and her twins and russell don't. Oldest graduated from college and has a job, twins at the [employer], triplets are seniors this year     GYNECOLOGIC HISTORY:    Patient's last menstrual period was 10/22/2022  (approximate).    Regular menses? yes  Menses every monthly days.  Length of menses: 4 days    Her current contraception method is: none.  She  reports that she has never smoked. She has never used smokeless tobacco.    Patient is sexually active.  STD testing offered?  Declined  Last PHQ-9 score on record =   PHQ-9 SCORE 2023   PHQ-9 Total Score 0     Last GAD7 score on record =   DALIA-7 SCORE 2023   Total Score 0     Alcohol Score = 0    HEALTH MAINTENANCE:  Cholesterol:   Recent Labs   Lab Test 20  0849 03/15/17  1027   CHOL 227* 213*   HDL 73 71   * 133*   TRIG 73 44     Last Mammo: One year ago, Result: Normal, Next Mammo: Today   Pap:   Lab Results   Component Value Date    GYNINTERP  2022     Negative for Intraepithelial Lesion or Malignancy (NILM), HPV NEG    PAP NIL 03/15/2017     Colonoscopy:  21, Result: polyps, Next Colonoscopy: 5 years.  Dexa:  NA    Health maintenance updated:  YES    HISTORY:  OB History    Para Term  AB Living   4 3 2 1 1 6   SAB IAB Ectopic Multiple Live Births   1 0 0 2 6      # Outcome Date GA Lbr Mckay/2nd Weight Sex Delivery Anes PTL Lv   4A  2006 33w4d   M       4B   33w4d   M       4C  / 33w4d   M       3A Term  37w0d   M       3B Term  37w0d   M       2 Term 2001    M       1 SAB                Patient Active Problem List   Diagnosis     PCOS (polycystic ovarian syndrome)     Menstrual migraine without status migrainosus, not intractable     Past Surgical History:   Procedure Laterality Date     APPENDECTOMY OPEN  1993      SECTION  ,      COLONOSCOPY N/A 2021    Procedure: Colonoscopy, With Polypectomy And Biopsy;  Surgeon: Mervat Cuba MD;  Location:  GI     DILATION AND CURETTAGE       KNEE SURGERY Left 2009     PYLOROTOMY  1975      Social History     Tobacco Use     Smoking status: Never     Smokeless tobacco: Never   Substance Use Topics     Alcohol use:  "No     Alcohol/week: 0.0 standard drinks      Problem (# of Occurrences) Relation (Name,Age of Onset)    Diabetes (1) Mother    Hyperlipidemia (1) Father    Colon Cancer (1) Paternal Grandfather            Current Outpatient Medications   Medication Sig     hydrocortisone 2.5 % cream APPLY 1 APPLICATION TOPICALLY TWICE DAILY TO THE FLAKEY AREAS ON FACE AND EARS FOR 2 WEEKS AT A TIME     ketoconazole (NIZORAL) 2 % external shampoo APPLY 1 APPLICATION TOPICALLY DAILY TO SCALP AND LET SET IN FOR 10 TO 15 MINUTES THEN RINSE AWAY     metFORMIN (GLUCOPHAGE) 850 MG tablet TAKE 1 TABLET TWICE A DAY  WITH MEALS     MULTIPLE VITAMIN PO      Multiple Vitamins-Minerals (ONE-A-DAY WOMENS PO)      naproxen sodium 220 MG capsule      Probiotic Product (PROBIOTIC-10) CHEW      No current facility-administered medications for this visit.     Allergies   Allergen Reactions     Sulfa Drugs Rash       Past medical, surgical, social and family histories were reviewed and updated in EPIC.    ROS:   12 point review of systems negative other than symptoms noted below or in the HPI.  No urinary frequency or dysuria, bladder or kidney problems    EXAM:  /80   Ht 1.562 m (5' 1.5\")   Wt 75.3 kg (166 lb)   LMP 10/22/2022 (Approximate)   Breastfeeding No   BMI 30.86 kg/m     BMI: Body mass index is 30.86 kg/m .    PHYSICAL EXAM:  Constitutional:   Appearance: Well nourished, well developed, alert, in no acute distress  Neck:  Lymph Nodes:  No lymphadenopathy present    Thyroid:  Gland size normal, nontender, no nodules or masses present  on palpation  Chest:  Respiratory Effort:  Breathing unlabored, CTA Bilaterally  Cardiovascular:    Heart: Auscultation:  Regular rate, normal rhythm, no murmurs present  Breasts: Inspection of Breasts:  No lymphadenopathy present., Palpation of Breasts and Axillae:  No masses present on palpation, no breast tenderness., Axillary Lymph Nodes:  No lymphadenopathy present. and No nodularity, asymmetry " or nipple discharge bilaterally.  Gastrointestinal:   Abdominal Examination:  Abdomen nontender to palpation, tone normal without rigidity or guarding, no masses present, umbilicus without lesions   Liver and Spleen:  No hepatomegaly present, liver nontender to palpation    Hernias:  No hernias present  Lymphatic: Lymph Nodes:  No other lymphadenopathy present  Skin:  General Inspection:  No rashes present, no lesions present, no areas of  discoloration  Neurologic:    Mental Status:  Oriented X3.  Normal strength and tone, sensory exam                grossly normal, mentation intact and speech normal.    Psychiatric:   Mentation appears normal and affect normal/bright.         Pelvic Exam:  External Genitalia:     Normal appearance for age, no discharge present, no tenderness present, no inflammatory lesions present, color normal  Vagina:     Normal vaginal vault without central or paravaginal defects, CLEAR D.C CONSISTENT W/ THE START OF OVULATION PRESENT, no inflammatory lesions present, no masses present  Bladder:     Nontender to palpation  Urethra:   Urethral Body:  Urethra palpation normal, urethra structural support normal   Urethral Meatus:  No erythema or lesions present  Cervix:     Appearance healthy, no lesions present, nontender to palpation, no bleeding present  Uterus:     Uterus: firm, normal sized and nontender, 8 week pregnancy sized and anteverted  in position.   Adnexa:     No adnexal tenderness present, no adnexal masses present  Perineum:     Perineum within normal limits, no evidence of trauma, no rashes or skin lesions present  Anus:     Anus within normal limits, no hemorrhoids present  Inguinal Lymph Nodes:     No lymphadenopathy present  Pubic Hair:     Normal pubic hair distribution for age  Genitalia and Groin:     No rashes present, no lesions present, no areas of discoloration, no masses present      COUNSELING:   Reviewed preventive health counseling, as reflected in patient  instructions       (Nisa)menopause management    BMI: Body mass index is 30.86 kg/m .  Weight management plan: Discussed healthy diet and exercise guidelines    ASSESSMENT:  47 year old female with satisfactory annual exam.    ICD-10-CM    1. Encounter for gynecological examination without abnormal finding  Z01.419       2. PCOS (polycystic ovarian syndrome)  E28.2 metFORMIN (GLUCOPHAGE) 850 MG tablet     Hemoglobin A1c      3. Screening for HIV (human immunodeficiency virus)  Z11.4       4. Encounter for hepatitis C screening test for low risk patient  Z11.59 Hepatitis C antibody     HIV Antigen Antibody Combo     Hepatitis C antibody      5. Screening for thyroid disorder  Z13.29 TSH with free T4 reflex      6. Screening for metabolic disorder  Z13.228 Comprehensive metabolic panel     CBC with platelets     Comprehensive metabolic panel     CBC with platelets     CANCELED: Comprehensive metabolic panel      7. Screening for diabetes mellitus  Z13.1       8. Encounter for vitamin deficiency screening  Z13.21 Vitamin D Deficiency      9. Encounter for lipid screening for cardiovascular disease  Z13.220     Z13.6       10. Screening for cardiovascular condition  Z13.6 Lipid panel reflex to direct LDL Fasting          PLAN:  Pap/hpv utd for 4 more years.     Mammo completed today.     Fasting labs completed today w/ HIV and Hep C.     C.s due in  3 more years and polyps found in 2021.     Discussed predisposition to DM given fhx and her h.o PCOS.  Didn't lose weight when initially went on metformin so don't expect that she'd have weight gain. However if she chose to stop and did, could always restart on it.  Other than her current period change which could certainly be age and perimenopause, pcos, or just recent illness and stress, she has been very regular for years and that is the only reason she was on metformin.  Her A1C's are always normal.  So may consider stopping it and see what she notices if anything but  will refill 850mg BID just in case she chooses to go back on.     Spent some time discussing weight, exercise, calorie intake and output, basal metabolic rates and changes after age 40 and also just expectations for v.m sx, perimenopausal changes in general and in cycles      Martha Meek MD

## 2023-01-06 ENCOUNTER — ANCILLARY PROCEDURE (OUTPATIENT)
Dept: MAMMOGRAPHY | Facility: CLINIC | Age: 48
End: 2023-01-06
Payer: COMMERCIAL

## 2023-01-06 ENCOUNTER — OFFICE VISIT (OUTPATIENT)
Dept: OBGYN | Facility: CLINIC | Age: 48
End: 2023-01-06
Payer: COMMERCIAL

## 2023-01-06 VITALS
BODY MASS INDEX: 30.55 KG/M2 | WEIGHT: 166 LBS | HEIGHT: 62 IN | DIASTOLIC BLOOD PRESSURE: 80 MMHG | SYSTOLIC BLOOD PRESSURE: 110 MMHG

## 2023-01-06 DIAGNOSIS — Z13.1 SCREENING FOR DIABETES MELLITUS: ICD-10-CM

## 2023-01-06 DIAGNOSIS — Z13.6 SCREENING FOR CARDIOVASCULAR CONDITION: ICD-10-CM

## 2023-01-06 DIAGNOSIS — Z13.21 ENCOUNTER FOR VITAMIN DEFICIENCY SCREENING: ICD-10-CM

## 2023-01-06 DIAGNOSIS — Z11.4 SCREENING FOR HIV (HUMAN IMMUNODEFICIENCY VIRUS): ICD-10-CM

## 2023-01-06 DIAGNOSIS — Z13.6 ENCOUNTER FOR LIPID SCREENING FOR CARDIOVASCULAR DISEASE: ICD-10-CM

## 2023-01-06 DIAGNOSIS — E28.2 PCOS (POLYCYSTIC OVARIAN SYNDROME): ICD-10-CM

## 2023-01-06 DIAGNOSIS — Z01.419 ENCOUNTER FOR GYNECOLOGICAL EXAMINATION WITHOUT ABNORMAL FINDING: Primary | ICD-10-CM

## 2023-01-06 DIAGNOSIS — Z13.29 SCREENING FOR THYROID DISORDER: ICD-10-CM

## 2023-01-06 DIAGNOSIS — Z13.228 SCREENING FOR METABOLIC DISORDER: ICD-10-CM

## 2023-01-06 DIAGNOSIS — Z13.220 ENCOUNTER FOR LIPID SCREENING FOR CARDIOVASCULAR DISEASE: ICD-10-CM

## 2023-01-06 DIAGNOSIS — Z12.31 VISIT FOR SCREENING MAMMOGRAM: ICD-10-CM

## 2023-01-06 DIAGNOSIS — Z11.59 ENCOUNTER FOR HEPATITIS C SCREENING TEST FOR LOW RISK PATIENT: ICD-10-CM

## 2023-01-06 LAB
ALBUMIN SERPL BCG-MCNC: 4.2 G/DL (ref 3.5–5.2)
ALP SERPL-CCNC: 50 U/L (ref 35–104)
ALT SERPL W P-5'-P-CCNC: 11 U/L (ref 10–35)
ANION GAP SERPL CALCULATED.3IONS-SCNC: 15 MMOL/L (ref 7–15)
AST SERPL W P-5'-P-CCNC: 20 U/L (ref 10–35)
BILIRUB SERPL-MCNC: 0.4 MG/DL
BUN SERPL-MCNC: 16.9 MG/DL (ref 6–20)
CALCIUM SERPL-MCNC: 9.1 MG/DL (ref 8.6–10)
CHLORIDE SERPL-SCNC: 103 MMOL/L (ref 98–107)
CHOLEST SERPL-MCNC: 224 MG/DL
CREAT SERPL-MCNC: 0.64 MG/DL (ref 0.51–0.95)
DEPRECATED HCO3 PLAS-SCNC: 21 MMOL/L (ref 22–29)
ERYTHROCYTE [DISTWIDTH] IN BLOOD BY AUTOMATED COUNT: 13.8 % (ref 10–15)
GFR SERPL CREATININE-BSD FRML MDRD: >90 ML/MIN/1.73M2
GLUCOSE SERPL-MCNC: 77 MG/DL (ref 70–99)
HBA1C MFR BLD: 5.1 % (ref 0–5.6)
HCT VFR BLD AUTO: 39 % (ref 35–47)
HDLC SERPL-MCNC: 64 MG/DL
HGB BLD-MCNC: 12.6 G/DL (ref 11.7–15.7)
LDLC SERPL CALC-MCNC: 147 MG/DL
MCH RBC QN AUTO: 28.9 PG (ref 26.5–33)
MCHC RBC AUTO-ENTMCNC: 32.3 G/DL (ref 31.5–36.5)
MCV RBC AUTO: 89 FL (ref 78–100)
NONHDLC SERPL-MCNC: 160 MG/DL
PLATELET # BLD AUTO: 298 10E3/UL (ref 150–450)
POTASSIUM SERPL-SCNC: 3.7 MMOL/L (ref 3.4–5.3)
PROT SERPL-MCNC: 7 G/DL (ref 6.4–8.3)
RBC # BLD AUTO: 4.36 10E6/UL (ref 3.8–5.2)
SODIUM SERPL-SCNC: 139 MMOL/L (ref 136–145)
TRIGL SERPL-MCNC: 64 MG/DL
TSH SERPL DL<=0.005 MIU/L-ACNC: 1.53 UIU/ML (ref 0.3–4.2)
WBC # BLD AUTO: 6.2 10E3/UL (ref 4–11)

## 2023-01-06 PROCEDURE — 77067 SCR MAMMO BI INCL CAD: CPT | Mod: TC | Performed by: RADIOLOGY

## 2023-01-06 PROCEDURE — 99396 PREV VISIT EST AGE 40-64: CPT | Performed by: OBSTETRICS & GYNECOLOGY

## 2023-01-06 PROCEDURE — 82306 VITAMIN D 25 HYDROXY: CPT | Performed by: OBSTETRICS & GYNECOLOGY

## 2023-01-06 PROCEDURE — 84443 ASSAY THYROID STIM HORMONE: CPT | Performed by: OBSTETRICS & GYNECOLOGY

## 2023-01-06 PROCEDURE — 80053 COMPREHEN METABOLIC PANEL: CPT | Performed by: OBSTETRICS & GYNECOLOGY

## 2023-01-06 PROCEDURE — 77063 BREAST TOMOSYNTHESIS BI: CPT | Mod: TC | Performed by: RADIOLOGY

## 2023-01-06 PROCEDURE — 87389 HIV-1 AG W/HIV-1&-2 AB AG IA: CPT | Performed by: OBSTETRICS & GYNECOLOGY

## 2023-01-06 PROCEDURE — 80061 LIPID PANEL: CPT | Performed by: OBSTETRICS & GYNECOLOGY

## 2023-01-06 PROCEDURE — 36415 COLL VENOUS BLD VENIPUNCTURE: CPT | Performed by: OBSTETRICS & GYNECOLOGY

## 2023-01-06 PROCEDURE — 85027 COMPLETE CBC AUTOMATED: CPT | Performed by: OBSTETRICS & GYNECOLOGY

## 2023-01-06 PROCEDURE — 83036 HEMOGLOBIN GLYCOSYLATED A1C: CPT | Performed by: OBSTETRICS & GYNECOLOGY

## 2023-01-06 PROCEDURE — 86803 HEPATITIS C AB TEST: CPT | Performed by: OBSTETRICS & GYNECOLOGY

## 2023-01-06 RX ORDER — HYDROCORTISONE 2.5 %
CREAM (GRAM) TOPICAL
COMMUNITY
Start: 2022-12-13

## 2023-01-06 RX ORDER — KETOCONAZOLE 20 MG/ML
SHAMPOO TOPICAL
COMMUNITY
Start: 2022-12-13

## 2023-01-06 ASSESSMENT — ANXIETY QUESTIONNAIRES
GAD7 TOTAL SCORE: 0
6. BECOMING EASILY ANNOYED OR IRRITABLE: NOT AT ALL
3. WORRYING TOO MUCH ABOUT DIFFERENT THINGS: NOT AT ALL
GAD7 TOTAL SCORE: 0
IF YOU CHECKED OFF ANY PROBLEMS ON THIS QUESTIONNAIRE, HOW DIFFICULT HAVE THESE PROBLEMS MADE IT FOR YOU TO DO YOUR WORK, TAKE CARE OF THINGS AT HOME, OR GET ALONG WITH OTHER PEOPLE: NOT DIFFICULT AT ALL
5. BEING SO RESTLESS THAT IT IS HARD TO SIT STILL: NOT AT ALL
7. FEELING AFRAID AS IF SOMETHING AWFUL MIGHT HAPPEN: NOT AT ALL
1. FEELING NERVOUS, ANXIOUS, OR ON EDGE: NOT AT ALL
2. NOT BEING ABLE TO STOP OR CONTROL WORRYING: NOT AT ALL

## 2023-01-06 ASSESSMENT — PATIENT HEALTH QUESTIONNAIRE - PHQ9
SUM OF ALL RESPONSES TO PHQ QUESTIONS 1-9: 0
5. POOR APPETITE OR OVEREATING: NOT AT ALL

## 2023-01-07 LAB
DEPRECATED CALCIDIOL+CALCIFEROL SERPL-MC: 39 UG/L (ref 20–75)
HCV AB SERPL QL IA: NONREACTIVE
HIV 1+2 AB+HIV1 P24 AG SERPL QL IA: NONREACTIVE

## 2023-01-07 NOTE — RESULT ENCOUNTER NOTE
Sheila,    Your cholesterol is elevated. The LDL, or bad cholesterol, should be under 130 and not 100 as shown (as long as you don't have any other heart disease risk factors like hypertension or diabetes), and your's is 147. Hopefully you can get back to eating healthy and exercise and that will help it improve. Let's plan to check it again next year.    Your complete blood count, thyroid, vitamin D, metabolic panel and hemoglobin A1C(a test for blood sugars the previous 3 months as a test for diabetes) are all normal. The one point below normal for carbon dioxide isn't a concern and just lab range of error.    The CDC also recommends a one time in adulthood test for HIV and hepatitis C, so we did that as well, and as expected they were both normal/negative.    Martha Meek MD

## 2023-03-30 DIAGNOSIS — E28.2 PCOS (POLYCYSTIC OVARIAN SYNDROME): ICD-10-CM

## 2023-03-30 NOTE — TELEPHONE ENCOUNTER
"Requested Prescriptions   Pending Prescriptions Disp Refills     metFORMIN (GLUCOPHAGE) 850 MG tablet [Pharmacy Med Name: METFORMIN TAB 850MG] 90 tablet 0     Sig: TAKE 1 TABLET TWICE A DAY  WITH MEALS       Biguanide Agents Passed - 3/30/2023  7:15 AM        Passed - Patient is age 10 or older        Passed - Recent (12 mo) or future (30 days) visit within the authorizing provider's specialty      Patient has had an office visit with the authorizing provider or a provider within the authorizing providers department within the previous 12 mos or has a future within next 30 days. See \"Patient Info\" tab in inbasket, or \"Choose Columns\" in Meds & Orders section of the refill encounter.              Passed - Patient does NOT have a diagnosis of CHF.        Passed - Medication is active on med list        Passed - Patient is not pregnant        Passed - Patient has not had a positive pregnancy test within the past 12 mos.            Refills available  Alexia Monson RN on 3/30/2023 at 8:22 AM    "

## 2024-01-10 NOTE — PROGRESS NOTES
Khloe is a 48 year old  female who presents for annual exam.     Besides routine health maintenance, she has no other health concerns today .    HPI:  The patient's PCP is Dr. Jasosn Barry MD.  Not fasting today     Pt presents for her annual exam. Does have Dr. Barry listed as her PCP and sees him intermittently but not annually. Had her last labs with me  and her LDL was 147 and she was fasting. Didn't realize it was high and I had recommended repeating this year.     Has gained about 9# since here last year though that was after having lost about exactly the same amount  to . BMI is 32. Tries to eat overall healthy and exercises but got off track with holidays and just being busy and hasn't focused on it as much.     Had Covid in  and had only one period between , and , thinks around October and then another 23. So if monthly then expecting one any day now.  The first time she had Covid it through her periods off a bit in the same way. Not having any v.m sx of any kind.  has a vas for their contraception. Not sure if this is some perimenopausal changes or just the illness. Had PCOS her whole life and very irregular periods when trying to conceive which is when metformin was started. However in the last many years has been really regular and monthly until this August to January period of time    Has several sibs so her 2 older sisters are about 10 years older then her. One had a really easy transition and one much harder into menopause and still having a lot of v.m sx all the time and not wanting to do HRT so just dealing with it.      Still taking her metformin 2x a day. No s.e from it and has been doing it mostly for h.o PCOS and lifelong irregular periods before having her kids. Wondering if her weight would go up if she stops taking metformin and since no issues from it and on it so long, thinks she'd prefer to just stay on it. Had discussed considering  stopping it for a bit to see what would happen, a year or two ago, but never did and still on 850 BID     Mother has diabetes but is not using insulin or meds and just managing with diet     works at  so got flu and covid vaxx at his work this season, but not in [facility] and the [facility] for tracking purposes.     All 6 boys were home for holidays. Triplets are 18 and seniors in HS. 2/3 want to go to [employer], last triplet wants to go to [employer] but so expensive that thinks he may just end up going to the [employer] [facility] [employer] with the others.  Oldest graduated from college and has a job, twins at the [employer] living in an apartment on campus in [place]. all doing really well and hoping that they have some scholarships and grants to help with cost of 5 in college at once but definitely are helping them pay for it     GYNECOLOGIC HISTORY:    Patient's last menstrual period was 12/16/2023 (approximate).    Regular menses? yes  Menses every 30-31 days.  Length of menses: 5 days    Her current contraception method is: vasectomy.  She  reports that she has never smoked. She has never used smokeless tobacco.    Patient is sexually active.  STD testing offered?  Declined  Last PHQ-9 score on record =       1/17/2024     9:10 AM   PHQ-9 SCORE   PHQ-9 Total Score 0     Last GAD7 score on record =       1/17/2024     9:10 AM   DALIA-7 SCORE   Total Score 0     Alcohol Score = 0    HEALTH MAINTENANCE:  Cholesterol:   Recent Labs   Lab Test 01/06/23  1022 11/04/20  0849   CHOL 224* 227*   HDL 64 73   * 139*   TRIG 64 73     Last Mammo: One year ago, Result: Normal, Next Mammo: Today     Pap:   Lab Results   Component Value Date    GYNINTERP  01/03/2022     Negative for Intraepithelial Lesion or Malignancy (NILM)    PAP NIL 03/15/2017     Colonoscopy:  04/26/21, Result: polyps, Next Colonoscopy: 5 years.  Dexa:  NA    Health maintenance updated:  Yes    Answers submitted by the patient for this visit:  Annual Preventive Visit (Submitted on  2024)  Chief Complaint: Annual Exam:  Frequency of exercise:: 4-5 days/week  Getting at least 3 servings of Calcium per day:: Yes  Diet:: Regular (no restrictions)  Taking medications regularly:: Yes  Medication side effects:: None  Bi-annual eye exam:: Yes  Dental care twice a year:: Yes  Sleep apnea or symptoms of sleep apnea:: None  abdominal pain: No  Blood in stool: No  Blood in urine: No  chest pain: No  chills: No  congestion: No  constipation: No  cough: No  diarrhea: No  dizziness: No  ear pain: No  eye pain: No  nervous/anxious: No  fever: No  frequency: No  genital sores: No  headaches: No  hearing loss: No  heartburn: No  arthralgias: No  joint swelling: No  peripheral edema: No  mood changes: No  myalgias: No  nausea: No  dysuria: No  palpitations: No  Skin sensation changes: No  sore throat: No  urgency: No  rash: No  shortness of breath: No  visual disturbance: No  weakness: No  pelvic pain: No  vaginal bleeding: No  vaginal discharge: No  tenderness: No  breast mass: No  breast discharge: No  Additional concerns today:: No  Exercise outside of work (Submitted on 2024)  Chief Complaint: Annual Exam:  Duration of exercise:: 15-30 minutes      HISTORY:  OB History    Para Term  AB Living   4 3 2 1 1 6   SAB IAB Ectopic Multiple Live Births   1 0 0 2 6      # Outcome Date GA Lbr Mckay/2nd Weight Sex Delivery Anes PTL Lv   4A   33w4d   M       4B   33w4d   M       4C  06 33w4d   M       3A Term  37w0d   M       3B Term  37w0d   M       2 Term     M       1 SAB                Patient Active Problem List   Diagnosis     PCOS (polycystic ovarian syndrome)     Menstrual migraine without status migrainosus, not intractable     Hypercholesteremia     Past Surgical History:   Procedure Laterality Date     APPENDECTOMY OPEN  1993      SECTION  , 2005     COLONOSCOPY N/A 2021    Procedure: Colonoscopy, With Polypectomy And  "Biopsy;  Surgeon: Mervat Cuba MD;  Location:  GI     DILATION AND CURETTAGE       KNEE SURGERY Left 2009     PYLOROTOMY  1975      Social History     Tobacco Use     Smoking status: Never     Smokeless tobacco: Never   Substance Use Topics     Alcohol use: No     Alcohol/week: 0.0 standard drinks of alcohol      Problem (# of Occurrences) Relation (Name,Age of Onset)    Diabetes (1) Mother    Hyperlipidemia (1) Father    Colon Cancer (1) Paternal Grandfather            Current Outpatient Medications   Medication Sig     hydrocortisone 2.5 % cream APPLY 1 APPLICATION TOPICALLY TWICE DAILY TO THE FLAKEY AREAS ON FACE AND EARS FOR 2 WEEKS AT A TIME     ketoconazole (NIZORAL) 2 % external shampoo APPLY 1 APPLICATION TOPICALLY DAILY TO SCALP AND LET SET IN FOR 10 TO 15 MINUTES THEN RINSE AWAY     metFORMIN (GLUCOPHAGE) 850 MG tablet TAKE 1 TABLET TWICE A DAY  WITH MEALS     Multiple Vitamins-Minerals (ONE-A-DAY WOMENS PO)      naproxen sodium 220 MG capsule      Probiotic Product (PROBIOTIC-10) CHEW      No current facility-administered medications for this visit.     Allergies   Allergen Reactions     Sulfa Antibiotics Rash       Past medical, surgical, social and family histories were reviewed and updated in EPIC.    EXAM:  /60   Ht 1.562 m (5' 1.5\")   Wt 79.1 kg (174 lb 6.4 oz)   LMP 12/16/2023 (Approximate)   Breastfeeding No   BMI 32.42 kg/m     BMI: Body mass index is 32.42 kg/m .    PHYSICAL EXAM:  Constitutional:   Appearance: Well nourished, well developed, alert, in no acute distress  Neck:  Lymph Nodes:  No lymphadenopathy present    Thyroid:  Gland size normal, nontender, no nodules or masses present  on palpation  Chest:  Respiratory Effort:  Breathing unlabored, CTA B  Cardiovascular:    Heart: Auscultation:  Regular rate, normal rhythm, no murmurs present  Breasts: Inspection of Breasts:  No lymphadenopathy present., Palpation of Breasts and Axillae:  No masses present on palpation, " no breast tenderness., Axillary Lymph Nodes:  No lymphadenopathy present., and No nodularity, asymmetry or nipple discharge bilaterally.  Gastrointestinal:   Abdominal Examination:  Abdomen nontender to palpation, tone normal without rigidity or guarding, no masses present, umbilicus without lesions   Liver and Spleen:  No hepatomegaly present, liver nontender to palpation    Hernias:  No hernias present  Lymphatic: Lymph Nodes:  No other lymphadenopathy present  Skin:  General Inspection:  No rashes present, no lesions present, no areas of  discoloration  Neurologic:    Mental Status:  Oriented X3.  Normal strength and tone, sensory exam                grossly normal, mentation intact and speech normal.    Psychiatric:   Mentation appears normal and affect normal/bright.         Pelvic Exam:  External Genitalia:     Normal appearance for age, no discharge present, no tenderness present, no inflammatory lesions present, color normal  Vagina:     Normal vaginal vault without central or paravaginal defects, no discharge present, no inflammatory lesions present, no masses present  Bladder:     Nontender to palpation  Urethra:   Urethral Body:  Urethra palpation normal, urethra structural support normal   Urethral Meatus:  No erythema or lesions present  Cervix:     Appearance healthy, no lesions present, nontender to palpation, no bleeding present  Uterus:     Uterus: firm, normal sized and nontender, anteverted in position.   Adnexa:     No adnexal tenderness present, no adnexal masses present  Perineum:     Perineum within normal limits, no evidence of trauma, no rashes or skin lesions present  Anus:     Anus within normal limits, no hemorrhoids present  Inguinal Lymph Nodes:     No lymphadenopathy present  Pubic Hair:     Normal pubic hair distribution for age  Genitalia and Groin:     No rashes present, no lesions present, no areas of discoloration, no masses present    COUNSELING:   Reviewed preventive health  counseling, as reflected in patient instructions       Regular exercise       Healthy diet/nutrition    BMI: Body mass index is 32.42 kg/m .  Weight management plan: Discussed healthy diet and exercise guidelines    ASSESSMENT:  48 year old female with satisfactory annual exam.    ICD-10-CM    1. Encounter for gynecological examination without abnormal finding  Z01.419       2. PCOS (polycystic ovarian syndrome)  E28.2 metFORMIN (GLUCOPHAGE) 850 MG tablet      3. Hypercholesteremia  E78.00 Lipid panel reflex to direct LDL Fasting      4. Screening for thyroid disorder  Z13.29 TSH with free T4 reflex      5. Screening for metabolic disorder  Z13.228 Comprehensive metabolic panel     CBC with platelets      6. Screening for diabetes mellitus  Z13.1 Hemoglobin A1c      7. Encounter for vitamin deficiency screening  Z13.21 Vitamin D Deficiency      8. Immunity status testing  Z01.84 Hepatitis B Surface Antibody        PLAN:    Pap is UTD for 3 more years.   Can follow routine ASCCP guidelines     Mammo today.    C.S UTD for 2.5 more years.     Fasting labs will be completed in the next year or at her next annual.  Could certainly do this with Dr. Barry as her PCP and at some point based on age or other health factors, will need her to do that type of primary care with him, however for now am ok to pend future labs so she can do them between annuals if chooses, or just at her next annual     The patient is showing that she is due for Hepatitis B vaccination. Will do antibody testing to see if showing immunity or not and then based on that will recommend if Hepatitis B vaccination is actually needed or not.      Additional health issues addressed at today's visit include:    Reviewed menopause and expectations in the coming few years in terms of periods, regularity and flow, V.M sx, mood/sleep/metabolic shifts  Given no v.m sx at all and covid illness just before menstrual disruption, does seem to be more related to  illness than perimenopause but will continue to monitor    Reviewed PCOS/insulin resistance, DM risk, Weight control and given years of taking metformin and tolerating it well and just not wanting to risk having untoward changes in the above, would prefer to just stay on it  Refills sent for Metformin 850mg BID for the year.         Martha Meek MD

## 2024-01-11 ASSESSMENT — ENCOUNTER SYMPTOMS
HEMATOCHEZIA: 0
FREQUENCY: 0
BREAST MASS: 0
MYALGIAS: 0
CHILLS: 0
COUGH: 0
PARESTHESIAS: 0
HEARTBURN: 0
HEMATURIA: 0
DIZZINESS: 0
PALPITATIONS: 0
SHORTNESS OF BREATH: 0
JOINT SWELLING: 0
DIARRHEA: 0
EYE PAIN: 0
CONSTIPATION: 0
WEAKNESS: 0
FEVER: 0
ABDOMINAL PAIN: 0
HEADACHES: 0
NERVOUS/ANXIOUS: 0
NAUSEA: 0
SORE THROAT: 0
ARTHRALGIAS: 0
DYSURIA: 0

## 2024-01-17 ENCOUNTER — OFFICE VISIT (OUTPATIENT)
Dept: OBGYN | Facility: CLINIC | Age: 49
End: 2024-01-17
Payer: COMMERCIAL

## 2024-01-17 ENCOUNTER — ANCILLARY PROCEDURE (OUTPATIENT)
Dept: MAMMOGRAPHY | Facility: CLINIC | Age: 49
End: 2024-01-17
Payer: COMMERCIAL

## 2024-01-17 VITALS
BODY MASS INDEX: 32.09 KG/M2 | DIASTOLIC BLOOD PRESSURE: 60 MMHG | SYSTOLIC BLOOD PRESSURE: 100 MMHG | WEIGHT: 174.4 LBS | HEIGHT: 62 IN

## 2024-01-17 DIAGNOSIS — Z13.228 SCREENING FOR METABOLIC DISORDER: ICD-10-CM

## 2024-01-17 DIAGNOSIS — E78.00 HYPERCHOLESTEREMIA: ICD-10-CM

## 2024-01-17 DIAGNOSIS — Z13.21 ENCOUNTER FOR VITAMIN DEFICIENCY SCREENING: ICD-10-CM

## 2024-01-17 DIAGNOSIS — Z01.419 ENCOUNTER FOR GYNECOLOGICAL EXAMINATION WITHOUT ABNORMAL FINDING: Primary | ICD-10-CM

## 2024-01-17 DIAGNOSIS — Z13.29 SCREENING FOR THYROID DISORDER: ICD-10-CM

## 2024-01-17 DIAGNOSIS — Z12.31 VISIT FOR SCREENING MAMMOGRAM: ICD-10-CM

## 2024-01-17 DIAGNOSIS — Z01.84 IMMUNITY STATUS TESTING: ICD-10-CM

## 2024-01-17 DIAGNOSIS — Z13.1 SCREENING FOR DIABETES MELLITUS: ICD-10-CM

## 2024-01-17 DIAGNOSIS — E28.2 PCOS (POLYCYSTIC OVARIAN SYNDROME): ICD-10-CM

## 2024-01-17 PROCEDURE — 77067 SCR MAMMO BI INCL CAD: CPT | Mod: TC | Performed by: RADIOLOGY

## 2024-01-17 PROCEDURE — 99396 PREV VISIT EST AGE 40-64: CPT | Performed by: OBSTETRICS & GYNECOLOGY

## 2024-01-17 PROCEDURE — 77063 BREAST TOMOSYNTHESIS BI: CPT | Mod: TC | Performed by: RADIOLOGY

## 2024-01-17 ASSESSMENT — ANXIETY QUESTIONNAIRES
5. BEING SO RESTLESS THAT IT IS HARD TO SIT STILL: NOT AT ALL
3. WORRYING TOO MUCH ABOUT DIFFERENT THINGS: NOT AT ALL
GAD7 TOTAL SCORE: 0
GAD7 TOTAL SCORE: 0
6. BECOMING EASILY ANNOYED OR IRRITABLE: NOT AT ALL
1. FEELING NERVOUS, ANXIOUS, OR ON EDGE: NOT AT ALL
7. FEELING AFRAID AS IF SOMETHING AWFUL MIGHT HAPPEN: NOT AT ALL
2. NOT BEING ABLE TO STOP OR CONTROL WORRYING: NOT AT ALL
IF YOU CHECKED OFF ANY PROBLEMS ON THIS QUESTIONNAIRE, HOW DIFFICULT HAVE THESE PROBLEMS MADE IT FOR YOU TO DO YOUR WORK, TAKE CARE OF THINGS AT HOME, OR GET ALONG WITH OTHER PEOPLE: NOT DIFFICULT AT ALL

## 2024-01-17 ASSESSMENT — PATIENT HEALTH QUESTIONNAIRE - PHQ9
5. POOR APPETITE OR OVEREATING: NOT AT ALL
SUM OF ALL RESPONSES TO PHQ QUESTIONS 1-9: 0

## 2025-01-23 NOTE — TELEPHONE ENCOUNTER
"Requested Prescriptions   Pending Prescriptions Disp Refills     metFORMIN (GLUCOPHAGE) 850 MG tablet [Pharmacy Med Name: METFORMIN TAB 850MG] 60 tablet 0     Sig: TAKE 1 TABLET TWICE DAILY  WITH MEALS (NO FURTHER     REFILLS UNTIL SEEN FOR     ANNUAL)       Biguanide Agents Passed - 11/17/2020  1:28 AM        Passed - Patient is age 10 or older        Passed - Recent (12 mo) or future (30 days) visit within the authorizing provider's specialty      Patient has had an office visit with the authorizing provider or a provider within the authorizing providers department within the previous 12 mos or has a future within next 30 days. See \"Patient Info\" tab in inbasket, or \"Choose Columns\" in Meds & Orders section of the refill encounter.              Passed - Patient does NOT have a diagnosis of CHF.        Passed - Medication is active on med list        Passed - Patient is not pregnant        Passed - Patient has not had a positive pregnancy test within the past 12 mos.            Last Written Prescription Date:  11/4/2020  Last Fill Quantity: 90,  # refills: 4   Last office visit: 11/4/2020 with prescribing provider:  Martha Meek   Future Office Visit:  none      Refills available  Alexia Monson RN on 11/17/2020 at 10:08 AM      "
Per chart, Pt is a 85 y/o M with PMH: "afib on eliquid, severe aortic stenosis (2/2 bicuspid aortic valve), s/p aortic valve replacement (7/2012), HTN, pHTN, HLD, chronic renal insufficiency, thrombocytopenia, GERD presenting from Dr. Ramirez office for tachycardia. Likely CHF exacerbation c/b PETER iso diuresis, AFib RVR s/p DARA/cardioversion."

## 2025-02-11 NOTE — PROGRESS NOTES
Khloe is a 49 year old  female who presents for annual exam.     Besides routine health maintenance, she has no other health concerns today .    HPI:  The patient's PCP is Dr. Jasson Barry MD. Fasting labs     Patient is overall doing great and hasn't had a period since last July. In  she had a couple skipped months but overall normal. Then  and  and nothing since. No v.m sx at all which she's been surprised by. One sister with easier transition through it and one with a lot of sx.  No abnormal discharge and no PMS type sx and actually other than difficulty losing weight just is shocked at how good she feels    Continues to eat healthy and exercising at least 4-6x/week  Now that all the boys are out of the house she isn't even having the junk and snacks that she used to have for them and then eat with them so really knows she's doing all the things she needs to do but gained 13# in one year, 2 yrs ago, and no matter what she does she can't get it off.    Patient's mom with DM and now on ozempic and has lost weight and does great but is very sedentary and never liked exercise. Also has fhx of high cholesterol so knows both of these things are something she is genetically more prone to have. Had fasting labs last year with LDL of 147. Is fasting today to repeat.    Intermittent soreness in right axilla. Doesn't feel a lump or mass, doesn't hurt to push on it per se, but intermittently will just be more achy there and never in her left.  Sometimes thinks maybe it was just working out and muscular. Has been there for over a year and no increase or change, just forgot to mention it last year.    Has very rare cold sores, at most 1-2 x per year and almost exclusively when on vacation and in the sun. Has never done anything for them but sis mentioned valtrex so wondering about that      Oldest graduated from the [employer] and is getting  this summer. The other 5 are all at the [employer].  Triplets are living together in a condo off campus but loving it and have made tons of friends  In marching band together as well          GYNECOLOGIC HISTORY:    Patient's last menstrual period was 2024 (approximate).    Regular menses? no  Menses. Less frequent 2024, 2024    Her current contraception method is: none.  She  reports that she has never smoked. She has never used smokeless tobacco.    Patient is sexually active.  STD testing offered?  Declined  Last PHQ-9 score on record =       2024     9:10 AM   PHQ-9 SCORE   PHQ-9 Total Score 0     Last GAD7 score on record =       2024     9:10 AM   DALIA-7 SCORE   Total Score 0     Alcohol Score =     HEALTH MAINTENANCE:  Cholesterol:   Recent Labs   Lab Test 23  1022 20  0849   CHOL 224* 227*   HDL 64 73   * 139*   TRIG 64 73     Last Mammo: One year ago, Result: Normal, Next Mammo: Today     Pap:   Lab Results   Component Value Date    GYNINTERP  2022     Negative for Intraepithelial Lesion or Malignancy (NILM)    PAP NIL 03/15/2017     Colonoscopy:  21, Result: polyps, Next Colonoscopy: 10 years.  Dexa:  NA    Health maintenance updated: Yes    HISTORY:  OB History    Para Term  AB Living   4 3 2 1 1 6   SAB IAB Ectopic Multiple Live Births   1 0 0 2 6      # Outcome Date GA Lbr Mckay/2nd Weight Sex Type Anes PTL Lv   4A   33w4d   M       4B   33w4d   M       4C  06 33w4d   M       3A Term  37w0d   M       3B Term  37w0d   M       2 Term     M       1 SAB                Patient Active Problem List   Diagnosis    PCOS (polycystic ovarian syndrome)    Menstrual migraine without status migrainosus, not intractable    Hypercholesteremia     Past Surgical History:   Procedure Laterality Date    APPENDECTOMY OPEN  1993     SECTION  , 2005    COLONOSCOPY N/A 2021    Procedure: Colonoscopy, With Polypectomy And Biopsy;  Surgeon: Mervat Cuba  "MD Deric;  Location:  GI    DILATION AND CURETTAGE      KNEE SURGERY Left 2009    PYLOROTOMY  1975      Social History     Tobacco Use    Smoking status: Never    Smokeless tobacco: Never   Substance Use Topics    Alcohol use: No     Alcohol/week: 0.0 standard drinks of alcohol      Problem (# of Occurrences) Relation (Name,Age of Onset)    Diabetes (1) Mother    Hyperlipidemia (1) Father    Colon Cancer (1) Paternal Grandfather              Current Outpatient Medications   Medication Sig Dispense Refill    desonide (DESOWEN) 0.05 % external ointment APPLY EXTERNALLY TO THE AFFECTED AREA TWICE DAILY TO PSORIASIS AND EARS FOR 2 WEEKS AT A TIME      hydrocortisone 2.5 % cream APPLY 1 APPLICATION TOPICALLY TWICE DAILY TO THE FLAKEY AREAS ON FACE AND EARS FOR 2 WEEKS AT A TIME      ketoconazole (NIZORAL) 2 % external shampoo APPLY 1 APPLICATION TOPICALLY DAILY TO SCALP AND LET SET IN FOR 10 TO 15 MINUTES THEN RINSE AWAY      metFORMIN (GLUCOPHAGE) 850 MG tablet TAKE 1 TABLET TWICE A DAY  WITH MEALS 180 tablet 3    Multiple Vitamins-Minerals (ONE-A-DAY WOMENS PO)       naproxen sodium 220 MG capsule       Probiotic Product (PROBIOTIC-10) CHEW       valACYclovir (VALTREX) 1000 mg tablet Take 2 tablets (2,000 mg) by mouth 2 times daily. 8 tablet 3     No current facility-administered medications for this visit.     Allergies   Allergen Reactions    Sulfa Antibiotics Rash       Past medical, surgical, social and family histories were reviewed and updated in EPIC.    EXAM:  /72   Ht 1.575 m (5' 2\")   Wt 79.3 kg (174 lb 12.8 oz)   LMP 07/20/2024 (Approximate)   Breastfeeding No   BMI 31.97 kg/m     BMI: Body mass index is 31.97 kg/m .    PHYSICAL EXAM:  Constitutional:   Appearance: Well nourished, well developed, alert, in no acute distress  Neck:  Lymph Nodes:  No lymphadenopathy present    Thyroid:  Gland size normal, nontender, no nodules or masses present  on palpation  Chest:  Respiratory Effort:  " Breathing unlabored  Cardiovascular:    Heart: Auscultation:  Regular rate, normal rhythm, no murmurs present  Breasts: Axillary Lymph Nodes:  No lymphadenopathy present., No nodularity, asymmetry or nipple discharge bilaterally., and HAS A LINEAR RIDGE OF MORE FIBROUS DENSE TISSUE RUNNING VERTICALLY IN HER RIGHT AXILLA THAT ISN'T THERE ON THE LEFT BUT THERE ARE NO MASSES OR TENDER AREAS, NO SPECIFIC JOSE E NOTED AT ALL BUT JUST MORE HARD BUT FOR AT LEAST 6-7 CM OF LENGTH MAKING IT MUCH MORE C/W JUST NORMAL VARIATION  Gastrointestinal:   Abdominal Examination:  Abdomen nontender to palpation, tone normal without rigidity or guarding, no masses present, umbilicus without lesions   Liver and Spleen:  No hepatomegaly present, liver nontender to palpation    Hernias:  No hernias present  Lymphatic: Lymph Nodes:  No other lymphadenopathy present  Skin:  General Inspection:  No rashes present, no lesions present, no areas of  discoloration  Neurologic:    Mental Status:  Oriented X3.  Normal strength and tone, sensory exam                grossly normal, mentation intact and speech normal.    Psychiatric:   Mentation appears normal and affect normal/bright.         Pelvic Exam:  External Genitalia:     Normal appearance for age, no discharge present, no tenderness present, no inflammatory lesions present, color normal  Vagina:     Normal vaginal vault without central or paravaginal defects, no discharge present, no inflammatory lesions present, no masses present  Bladder:     Nontender to palpation  Urethra:   Urethral Body:  Urethra palpation normal, urethra structural support normal   Urethral Meatus:  No erythema or lesions present  Cervix:     Appearance healthy, no lesions present, nontender to palpation, no bleeding present  Uterus:     Uterus: firm, normal sized and nontender, anteverted in position.   Adnexa:     No adnexal tenderness present, no adnexal masses present  Perineum:     Perineum within normal limits, no  evidence of trauma, no rashes or skin lesions present  Anus:     Anus within normal limits, no hemorrhoids present  Inguinal Lymph Nodes:     No lymphadenopathy present  Pubic Hair:     Normal pubic hair distribution for age  Genitalia and Groin:     No rashes present, no lesions present, no areas of discoloration, no masses present    COUNSELING:   Reviewed preventive health counseling, as reflected in patient instructions  Special attention given to:        Regular exercise       Healthy diet/nutrition       (Nisa)menopause management    BMI: Body mass index is 31.97 kg/m .  Weight management plan: Discussed healthy diet and exercise guidelines    ASSESSMENT:  49 year old female with satisfactory annual exam.    ICD-10-CM    1. Encounter for gynecological examination without abnormal finding  Z01.419       2. PCOS (polycystic ovarian syndrome)  E28.2 metFORMIN (GLUCOPHAGE) 850 MG tablet      3. Cold sore  B00.1 valACYclovir (VALTREX) 1000 mg tablet     DISCONTINUED: valACYclovir (VALTREX) 1000 mg tablet      4. Hypercholesteremia  E78.00 Lipid panel reflex to direct LDL Fasting      5. Screening for metabolic disorder  Z13.228 Comprehensive metabolic panel     CBC with platelets      6. Screening for thyroid disorder  Z13.29 TSH with free T4 reflex      7. Encounter for vitamin deficiency screening  Z13.21 Vitamin D Deficiency      8. Screening for diabetes mellitus  Z13.1 Hemoglobin A1c      9. Immunity status testing  Z01.84 Hepatitis B Surface Antibody          PLAN:  Pap is UTD for 2 more years.   Can follow routine ASCCP guidelines     Mammo today and can do routine 3D screening. Discussed breast density and limitations of mammo and discussion of additional imaging and what kind and when appropriate  The area in right axilla is large and long and not an actual mass and just feels like more dense axillar breast tissue   Since no change or worsening in sx and just intermittent for so long, if normal mammo then  nor further imaging unless sx change or what she is palpating changes    C.S is UTD for 1 more year    Fasting labs today  The patient is showing that she is due for Hepatitis B vaccination. Will do antibody testing to see if showing immunity or not and then based on that will recommend if Hepatitis B vaccination is actually needed or not.    Will address any abnormalities once results are back.      Additional health issues addressed at today's visit include:    Discussed menopause and if no menses by 7/2025 then officially menopausal regardless of sx or lab testing  Could still get period back based on her age and no sx at all, but some will transition more easily and w/o significant issues and then any bleeding after 1 yr of amenorrhea has to be evaulated as abnormal until proven otherwise.    Discussed HRT and what it can and won't help with and what the pros/cons/risks are.  Would not help with metabolism as that is her biggest frustrtation  Discussed stimulant meds and GLP-1 meds compounded and FDA approved, costs, mech of action, my support of them when meets FDA criteria and doing the right things w/o success  However should not derail her healthy lifestyle choices and at least continue to do what she has been as the alternative is she would gain much faster that way.    Reviewed her PCOS dx and metformin.  In near menopause, and for sure after menopause would not have PCOS dx and metformin is not then indicated.  However there is insulin resistance, and fhx of dm and stopping it could possibly affect weight, etc  Discussed pros/cons of staying on it or not, A1C has always been great and on meds may not know if has true DM or not, barring very high pre-DM A1C on meds which would be different for her.  For now has no s.e and doesn't want to risk some negative with stopping it and would like to continue on it so refills sent in on it    Reviewed cold sore therapy and daily suppression vs episodic and the  latter is clearly more appropriate for her  Rx for 1000 mg tabs sent in and would recommend 2000mg BID for 1 day right at the first sign of sx  Rx sent in for this    20  minutes in addition to the routine annual preventative exam,  was spent on direct management of the patient's other medical issue(s) as above, including chart review and chart completion, on the same DOS    Martha Meek MD

## 2025-02-12 ENCOUNTER — OFFICE VISIT (OUTPATIENT)
Dept: OBGYN | Facility: CLINIC | Age: 50
End: 2025-02-12
Payer: COMMERCIAL

## 2025-02-12 ENCOUNTER — ANCILLARY PROCEDURE (OUTPATIENT)
Dept: MAMMOGRAPHY | Facility: CLINIC | Age: 50
End: 2025-02-12
Payer: COMMERCIAL

## 2025-02-12 VITALS
DIASTOLIC BLOOD PRESSURE: 72 MMHG | SYSTOLIC BLOOD PRESSURE: 100 MMHG | BODY MASS INDEX: 32.17 KG/M2 | WEIGHT: 174.8 LBS | HEIGHT: 62 IN

## 2025-02-12 DIAGNOSIS — E28.2 PCOS (POLYCYSTIC OVARIAN SYNDROME): ICD-10-CM

## 2025-02-12 DIAGNOSIS — Z12.31 VISIT FOR SCREENING MAMMOGRAM: ICD-10-CM

## 2025-02-12 DIAGNOSIS — Z13.29 SCREENING FOR THYROID DISORDER: ICD-10-CM

## 2025-02-12 DIAGNOSIS — B00.1 COLD SORE: ICD-10-CM

## 2025-02-12 DIAGNOSIS — Z13.21 ENCOUNTER FOR VITAMIN DEFICIENCY SCREENING: ICD-10-CM

## 2025-02-12 DIAGNOSIS — E78.00 HYPERCHOLESTEREMIA: ICD-10-CM

## 2025-02-12 DIAGNOSIS — Z13.1 SCREENING FOR DIABETES MELLITUS: ICD-10-CM

## 2025-02-12 DIAGNOSIS — Z01.84 IMMUNITY STATUS TESTING: ICD-10-CM

## 2025-02-12 DIAGNOSIS — Z01.419 ENCOUNTER FOR GYNECOLOGICAL EXAMINATION WITHOUT ABNORMAL FINDING: Primary | ICD-10-CM

## 2025-02-12 DIAGNOSIS — Z13.228 SCREENING FOR METABOLIC DISORDER: ICD-10-CM

## 2025-02-12 LAB
ALBUMIN SERPL BCG-MCNC: 4.5 G/DL (ref 3.5–5.2)
ALP SERPL-CCNC: 69 U/L (ref 40–150)
ALT SERPL W P-5'-P-CCNC: 9 U/L (ref 0–50)
ANION GAP SERPL CALCULATED.3IONS-SCNC: 16 MMOL/L (ref 7–15)
AST SERPL W P-5'-P-CCNC: 21 U/L (ref 0–45)
BILIRUB SERPL-MCNC: 0.4 MG/DL
BUN SERPL-MCNC: 17.4 MG/DL (ref 6–20)
CALCIUM SERPL-MCNC: 9.4 MG/DL (ref 8.8–10.4)
CHLORIDE SERPL-SCNC: 101 MMOL/L (ref 98–107)
CHOLEST SERPL-MCNC: 236 MG/DL
CREAT SERPL-MCNC: 0.68 MG/DL (ref 0.51–0.95)
EGFRCR SERPLBLD CKD-EPI 2021: >90 ML/MIN/1.73M2
ERYTHROCYTE [DISTWIDTH] IN BLOOD BY AUTOMATED COUNT: 14.4 % (ref 10–15)
EST. AVERAGE GLUCOSE BLD GHB EST-MCNC: 100 MG/DL
FASTING STATUS PATIENT QL REPORTED: YES
FASTING STATUS PATIENT QL REPORTED: YES
GLUCOSE SERPL-MCNC: 93 MG/DL (ref 70–99)
HBA1C MFR BLD: 5.1 % (ref 0–5.6)
HBV SURFACE AB SERPL IA-ACNC: 12.4 M[IU]/ML
HBV SURFACE AB SERPL IA-ACNC: REACTIVE M[IU]/ML
HCO3 SERPL-SCNC: 23 MMOL/L (ref 22–29)
HCT VFR BLD AUTO: 42.7 % (ref 35–47)
HDLC SERPL-MCNC: 71 MG/DL
HGB BLD-MCNC: 13.6 G/DL (ref 11.7–15.7)
LDLC SERPL CALC-MCNC: 147 MG/DL
MCH RBC QN AUTO: 27.5 PG (ref 26.5–33)
MCHC RBC AUTO-ENTMCNC: 31.9 G/DL (ref 31.5–36.5)
MCV RBC AUTO: 86 FL (ref 78–100)
NONHDLC SERPL-MCNC: 165 MG/DL
PLATELET # BLD AUTO: 337 10E3/UL (ref 150–450)
POTASSIUM SERPL-SCNC: 3.8 MMOL/L (ref 3.4–5.3)
PROT SERPL-MCNC: 7.7 G/DL (ref 6.4–8.3)
RBC # BLD AUTO: 4.94 10E6/UL (ref 3.8–5.2)
SODIUM SERPL-SCNC: 140 MMOL/L (ref 135–145)
TRIGL SERPL-MCNC: 92 MG/DL
TSH SERPL DL<=0.005 MIU/L-ACNC: 1.31 UIU/ML (ref 0.3–4.2)
VIT D+METAB SERPL-MCNC: 34 NG/ML (ref 20–50)
WBC # BLD AUTO: 5.6 10E3/UL (ref 4–11)

## 2025-02-12 PROCEDURE — 77067 SCR MAMMO BI INCL CAD: CPT | Mod: TC | Performed by: RADIOLOGY

## 2025-02-12 PROCEDURE — 77063 BREAST TOMOSYNTHESIS BI: CPT | Mod: TC | Performed by: RADIOLOGY

## 2025-02-12 RX ORDER — VALACYCLOVIR HYDROCHLORIDE 1 G/1
2000 TABLET, FILM COATED ORAL 2 TIMES DAILY
Qty: 4 TABLET | Refills: 3 | Status: SHIPPED | OUTPATIENT
Start: 2025-02-12 | End: 2025-02-12

## 2025-02-12 RX ORDER — VALACYCLOVIR HYDROCHLORIDE 1 G/1
2000 TABLET, FILM COATED ORAL 2 TIMES DAILY
Qty: 8 TABLET | Refills: 3 | Status: SHIPPED | OUTPATIENT
Start: 2025-02-12

## 2025-02-12 RX ORDER — DESONIDE 0.5 MG/G
OINTMENT TOPICAL
COMMUNITY
Start: 2024-10-16

## 2025-05-16 ENCOUNTER — HOSPITAL ENCOUNTER (OUTPATIENT)
Dept: MAMMOGRAPHY | Facility: CLINIC | Age: 50
Discharge: HOME | End: 2025-05-16
Attending: OBSTETRICS & GYNECOLOGY
Payer: COMMERCIAL

## 2025-05-16 DIAGNOSIS — M79.621 AXILLARY PAIN, RIGHT: ICD-10-CM

## 2025-05-16 PROCEDURE — 77065 DX MAMMO INCL CAD UNI: CPT | Mod: RT

## 2025-05-16 PROCEDURE — 76882 US LMTD JT/FCL EVL NVASC XTR: CPT | Mod: RT

## 2025-06-30 ENCOUNTER — OFFICE VISIT (OUTPATIENT)
Dept: VASCULAR SURGERY | Facility: CLINIC | Age: 50
End: 2025-06-30
Payer: COMMERCIAL

## 2025-06-30 DIAGNOSIS — I83.812 VARICOSE VEINS OF LEFT LOWER EXTREMITY WITH PAIN: Primary | ICD-10-CM

## 2025-06-30 PROCEDURE — 99202 OFFICE O/P NEW SF 15 MIN: CPT | Performed by: SURGERY

## 2025-06-30 NOTE — LETTER
6/30/2025      Khloe Smith  [address]  [facility] [address]      Dear Colleague,    Thank you for referring your patient, Khloe Smith, to the [facility] [place]. Please see a copy of my visit note below.    [facility]: Devi Smith comes to see us today for a feeling of fullness more in her left than right leg.      PMH: Medications: Glucophage(polycystic ovary syndrome), NSAIDs, vitamins    5/13/2015-closure left LSV and 1 unit phlebectomy with me.  At that time the GSV was competent.  LSV measured over 9 mm in diameter giving off the very large varicosities.    Patient initially did great following surgery.  However, over the last 2 to 3 years she has noted increasing symptoms basically within the left leg and very minor problems on the right.  The symptoms have been much worse over the last 4 months.  She will notice some swelling within the left calf and ankle.  A fullness and throbbing pain is noted more in the popliteal region but also extending down to the ankle.  She has noticed a large varicosity that is redeveloped over this time..  She wears compression quite frequently maybe up to 6 hours daily for the last 6 to 7 weeks and has done this intermittently since her recurrence of symptoms.  She elevates her legs whenever possible.  Her symptoms are exacerbated by any prolonged sitting or standing.  The compression does help her symptoms but not complete resolution.      No history of phlebitis, DVT, ulceration, bleeding.      Original symptoms developed after her triplets were born in 2005 at which time she wore her thigh-high compression throughout the entire pregnancy.  Tries avoid any medications such as Advil even with the discomfort.      Exam: Alert and appropriate.  Very pleasant and talkative.   Chest= clear   Cardiovascular= regular rate   Somewhat heavyset legs.  Skin is normal.  Normal sensation.   Very mild left ankle swelling.   +3  DP pulses bilaterally.   Palpable 4 to 5 mm varicose veins starting in the mid left posterior medial thigh and extending through the popliteal region to the mid posterior calf.  No evidence of phlebitis.  No obvious varicosities on the right.    IMPRESSION: Progressively symptomatic left leg varicose veins.  Certainly some of her discomfort is from the large varicose vein which perhaps is related to the Giacomini vein by its location but also could be related to new or incompetence of the GSV.    She has tried compression and has ongoing issues and thus further evaluation surgical treatment is probably indicated.  Will start with a left leg venous duplex and have a video follow-up with results.  At a minimum she will require phlebectomies but the duplex will tell us if any other closure is necessary.    She has very mild symptoms in the right leg and thus no specific evaluation or treatment at this time.      VCSS: Right= 5.  Left= 8.  CEAP: Right= C2.  Left= C3    VEIN CLINIC LEG DRAWING:          Over 25 minutes with patient today including old chart review.  Lang Tomlin MD      Again, thank you for allowing me to participate in the care of your patient.        Sincerely,        Lang Tomlin MD    Electronically signed

## 2025-06-30 NOTE — PROGRESS NOTES
[facility]: Devi Smith comes to see us today for a feeling of fullness more in her left than right leg.      PMH: Medications: Glucophage(polycystic ovary syndrome), NSAIDs, vitamins    5/13/2015-closure left LSV and 1 unit phlebectomy with me.  At that time the GSV was competent.  LSV measured over 9 mm in diameter giving off the very large varicosities.    Patient initially did great following surgery.  However, over the last 2 to 3 years she has noted increasing symptoms basically within the left leg and very minor problems on the right.  The symptoms have been much worse over the last 4 months.  She will notice some swelling within the left calf and ankle.  A fullness and throbbing pain is noted more in the popliteal region but also extending down to the ankle.  She has noticed a large varicosity that is redeveloped over this time..  She wears compression quite frequently maybe up to 6 hours daily for the last 6 to 7 weeks and has done this intermittently since her recurrence of symptoms.  She elevates her legs whenever possible.  Her symptoms are exacerbated by any prolonged sitting or standing.  The compression does help her symptoms but not complete resolution.      No history of phlebitis, DVT, ulceration, bleeding.      Original symptoms developed after her triplets were born in 2005 at which time she wore her thigh-high compression throughout the entire pregnancy.  Tries avoid any medications such as Advil even with the discomfort.      Exam: Alert and appropriate.  Very pleasant and talkative.   Chest= clear   Cardiovascular= regular rate   Somewhat heavyset legs.  Skin is normal.  Normal sensation.   Very mild left ankle swelling.   +3 DP pulses bilaterally.   Palpable 4 to 5 mm varicose veins starting in the mid left posterior medial thigh and extending through the popliteal region to the mid posterior calf.  No evidence of phlebitis.  No obvious varicosities on the  right.    IMPRESSION: Progressively symptomatic left leg varicose veins.  Certainly some of her discomfort is from the large varicose vein which perhaps is related to the Giacomini vein by its location but also could be related to new or incompetence of the GSV.    She has tried compression and has ongoing issues and thus further evaluation surgical treatment is probably indicated.  Will start with a left leg venous duplex and have a video follow-up with results.  At a minimum she will require phlebectomies but the duplex will tell us if any other closure is necessary.    She has very mild symptoms in the right leg and thus no specific evaluation or treatment at this time.      VCSS: Right= 5.  Left= 8.  CEAP: Right= C2.  Left= C3    VEIN CLINIC LEG DRAWING:          Over 25 minutes with patient today including old chart review.  Lang Tomlin MD

## 2025-07-03 ENCOUNTER — ANCILLARY PROCEDURE (OUTPATIENT)
Dept: ULTRASOUND IMAGING | Facility: CLINIC | Age: 50
End: 2025-07-03
Attending: SURGERY
Payer: COMMERCIAL

## 2025-07-03 DIAGNOSIS — I83.812 VARICOSE VEINS OF LEFT LOWER EXTREMITY WITH PAIN: ICD-10-CM

## 2025-07-07 ENCOUNTER — VIRTUAL VISIT (OUTPATIENT)
Dept: VASCULAR SURGERY | Facility: CLINIC | Age: 50
End: 2025-07-07
Attending: SURGERY
Payer: COMMERCIAL

## 2025-07-07 DIAGNOSIS — I83.812 VARICOSE VEINS OF LEG WITH PAIN, LEFT: Primary | ICD-10-CM

## 2025-07-07 PROCEDURE — 98004 SYNCH AUDIO-VIDEO EST SF 10: CPT | Performed by: SURGERY

## 2025-07-07 NOTE — PROGRESS NOTES
[facility]: Devi    Khloe Smith is a follow-up appointment after being seen on 6/30/2025.  Successful closure of the left LSV 5/13/2015 with incompetent GSV at that time.  For several years but over the last 2 to 3 years with increasing left leg painful varicosities despite trial of compression.    -- 7/3/2025 left leg venous duplex: No deep venous insufficiency.  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  LSV not visualized.  Normal accessory saphenous vein.  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  I sent her images of the incompetent greater saphenous vein for primary reason of her symptomatic varicosities.  It also communicates with the Giacomini vein and I am not sure how significant this is contributing.    Symptoms despite aggressive compression therapy I would recommend surgical treatment.  In the clinic under light oral sedation like we have done for her lesser saphenous vein and tumescent anesthetic we closed the greater saphenous vein from the junction at least to the proximal calf.  If we can avoid closing more distally this would avoid the potential of numbness to the greater saphenous nerve that I discussed today and previously.    Would evaluate the Giacomini vein at the same time to see if that should be closed size and length.  Cosmetic phlebectomies would be performed at this may be adequate about closing the Giacomini's was discussed.    Prior procedure this would be performed here in the clinic with compression for 72 hours and follow-up duplex at that time.  Thigh-high compression stockings for approximately 2 weeks and be very cautious with sun exposure.    Since getting  in [place] and with her discomfort she would certainly like to have this performed prior  to the wedding which I think we can arrange.    Video call today being completed at 1529 hrs    Lang Tomlin MD

## 2025-07-07 NOTE — PROGRESS NOTES
July 7, 2025    Vein Procedure Recommendation    Called and spoke with patient.    Dr. Tomlin has recommended patient to have the following vein procedure(s):     1. Left leg VNUS closure GSV, Giacomini and 1 unit Phlebectomies (cosmetic)    $789= 1 unit cosmetic phlebectomies    Patient Pre-op Questions:  Preferred Pharmacy: [facility] [id number]  Anticoagulant/ASA: No  Artificial Joint or Heart Valve:  No  Open ulcer:  No  Sedation nausea: No      Patient is recommended to wear Thigh High compression hose following her procedure. Discussed compression hose. Explained to patient if insurance doesn't cover the compression hose there are a couple different options to getting them and to call the clinic to let us know if they need help. Calling insurance on thigh high coverage, insurance code given.     Entered in patient's After Visit Summary (viewable in [facility]) written procedure instructions to review on her own (see After Visit Summary).    Next steps:    Insurance Submission  Informed patient this process could take up to 14 business days, but once approved, the patient will be contacted by our surgery scheduler to schedule the above procedure. Gave patient our surgery scheduler's information.    Informed patient to call our office regarding the status of their insurance authorization if it has been more than 3 weeks and have not heard from our surgery scheduler.    Patient is in agreement with all of the above and has no further questions at this time.    Rashmi Martinez RN  [facility]  Vein Clinic

## 2025-07-07 NOTE — PATIENT INSTRUCTIONS
Kathy Bob, Surgery Scheduler - [phone].    Procedure Plan: 1. Left leg VNUS closure GSV, Giacomini and 1 unit Phlebectomies (cosmetic)    $789= 1 unit cosmetic phlebectomies    Please call our office regarding the status of your insurance authorization if it has been more than 3 weeks and you have not heard from our surgery scheduler.         Pre-Procedure Instructions:                           VNUS Closure and Phlebectomies   You are having a Closure(s) - where one or more veins are closed and Phlebectomies - where one or more veins are removed.   Insurance  Precertification and/or referral authorization may be required by your insurance company.  We will call your insurance company to verify benefits for the medically necessary part of your procedure.    Your Current Medications and Allergies  To reduce bruising, please do not take aspirin-type medications (Motrin, Aleve, Ibuprofen, Advil, etc.) for three days before your procedure. You may take Tylenol if you need a pain reliever.  Are you on blood thinner medications? (Plavix, Coumadin, Eliquis, Xarelto) Please discuss this with your surgeon. You may resume taking your blood thinner medication after your procedure.  Are you sensitive to latex or adhesives used for fake fingernails? Please let us know!    Driving Escort and   Please arrange to have a trusted adult (18 years old or older) drive you to and from the clinic.  For your safety, we recommend you have a trusted adult to stay with you until the next morning.    Your Health  If you have a change in your health before the procedure, contact our office immediately. (For example: cold symptoms, cough, urinary tract infection, fever, flu symptoms.)  A pre-procedure physical is not required.    Note  It is sometimes necessary to adjust the procedure schedule due to emergencies. We greatly appreciate your flexibility and understanding in this matter.                  Check List: The Morning  of Your Procedure  ___1. Please do not put anything on your leg(s) or shave the day of your procedure.  ___2. You may take your normal medications the day of your procedure.  ___3. It is recommended you eat a light breakfast or lunch the day of your procedure.  ___4. Wear comfortable loose-fitting clothing and wide-fitting shoes (i.e. tennis shoes, slip-ons).  ___5. Please arrive at our clinic at the specified time given by the nurse.  ___6. You will sign an affirmation of informed consent.  ___7. Bring your pre-procedure sedation medication (lorazepam and clonidine) with you to the clinic.              One hour before your procedure, you will be instructed to take these medications. The lorazepam              (Ativan) lowers anxiety and sedates you; the clonidine makes the lorazepam more effective. Everyone's              body processes these medications differently. Therefore, reactions to these medications vary. Some              people stay awake and some people sleep through the whole procedure. You may not remember              everything about the procedure or the day. You do not want to make any big decisions for the rest of the              day.    The Day of Your Procedure:       VNUS Closure and Phlebectomies  In the Exam Room  A nurse will bring you back to an exam room with your family member or friend. This is when your informed consent will be signed, and you will take your pre-procedure medications.  You will be asked to remove everything from the waist down, including undergarments. You will then put on a hospital gown or shorts and blue booties.  Your surgeon will come in to answer any questions and emilie any bulging varicose veins to be removed.  You will be taken to the restroom to empty your bladder before going into the procedure room.    In the Procedure Room  You will be escorted to the procedure room. You will lie on a procedure table covered with a sheet or blanket.  A nurse will put a blood  pressure cuff on your arm and a pulse/oxygen monitor on a finger. Your vital signs will be monitored every 15 minutes.  Your gown will be pulled up slightly and the groin exposed for a short period of time. The surgeon's assistant will clean your foot, leg, and groin with an antibacterial solution. We will get you covered up as quickly as possible!  Sterile towels and blue drapes will be used to cover you and the table. You will be asked to keep your hands under the blue drapes during the procedure.  The lights will be turned down. The table will be tipped so your head is higher than your feet. You may feel like you're going to slide off, but you won't.    The Procedure  The surgeon will visualize your veins with an ultrasound machine. He or she will then numb your skin and access the vein. A catheter is passed up the vein and positioned with ultrasound guidance. The table will then be tipped head down.  Once the catheter is in the correct position, medication will be injected to numb your leg. You will feel some needle sticks and may feel discomfort as the medication goes in. Once this is done, you should not experience significant discomfort. But if you do, please let us know and more numbing medication can be injected. As the catheter sends out heat, the vein closes off and the catheter is withdrawn.  For the phlebectomy part of the procedure, small incisions are made where the bulging varicose veins have been marked on your leg(s) and these veins will be removed using a small laisha hook instrument.    Post-Procedure  Once the procedure is done, your leg(s) will be washed with warm water and dried. Your leg(s) will be bandaged with large soft dressings and a large ACE bandage wrapped from toes to groin.   You will be offered something to drink and a light snack.  You will rest with your leg(s) elevated for approximately 30 minutes. Your friend or family member may join you.  For your safety, you will be taken  to your car in a wheelchair. If you are able to, it is good to keep your leg(s) elevated on the car ride home.    Post-Procedure Instructions:             VNUS Closure and Phlebectomies      Post-Op Day Zero - The Day of Your Procedure:0  1. Medication for Pain Control and Inflammation Control   - The numbing medication injected during your procedure will last for several hours. The pre-procedure                 tablets may make you very sleepy and you might not remember everything from the procedure or from                 the day. This will usually wear off by the next day.   - Ibuprofen:  If tolerated, take ibuprofen (e.g., Advil) to reduce inflammation whether or not you have                 pain. For three days, take two tablets (200 mg each) with every meal and at bedtime with a snack. If                 your pain is not controlled with ibuprofen, you may take prescription pain medication (such as Norco),                 if prescribed.   - You may resume taking any medications you were taking before your procedure.  2. Activity   - Rest with your leg(s) elevated above your heart. This will prevent from a lot of swelling and                 bleeding. You do not need to elevate your leg(s) while sleeping at night. You may go upstairs, sit up to                 eat, use the bathroom, and take several five-minute walks. Otherwise, keep your leg(s) elevated.                 Minimize the amount of time you are up on your feet to about 30 minutes at a time.  3. Bandages   - The incision sites will be covered with soft bandages and an ACE wrap. Keep your bandages on                 and dry for 48 hours. The ACE should provide  snug  compression but should not cause pain or                 numbness in the toes. If you have significant discomfort or your toes become cold or numb, unwrap                 your ACE and rewrap with less tension starting at the toes wrapping upward.  4. Incisions   - Bleeding: You may see  some incision sites that are oozing through the bandages. This is not unusual      and can be managed with Rest, Ice, Compression and Elevation (RICE). Apply ice and firm pressure      directly to the site that is bleeding and rest with your leg(s) elevated above your heart for 20-30                 minutes.    Post-Op Day One:  1. Medication   - Ibuprofen: Continue the same as the Day of Your Procedure. If your pain is not controlled with                 ibuprofen, you may take prescription pain medication (such as Norco), if prescribed.  2. Activity   - We would like you to get up at least six times and walk around for short periods of time, unless it is      causing you pain. You should not be on your feet more than 90 minutes at a time. Elevate your leg      above your heart when you are not walking.  3. Bandages   - Your bandages must be kept on and dry for 48 hours..  4. Driving   - You may resume driving when you can do so safely. Do not drive if you are taking narcotic pain      medication.  Post-Op Day Two:   1. Medication  - Ibuprofen: Continue the same as the Day of Your Procedure.  2.  Activity  - Walk as tolerated. Elevate as much as possible when not walking.  3. Bandages and Compression  - Remove ACE wrap and padding. Shower and put on your compression hose during waking hours only       for at least 5 days. (Your doctor may instruct you to keep your bandages on until your return     appointment; please follow your doctor's instructions.)  4. Incisions  - Your leg(s) will be bruised; there may be swelling, hard knots under the skin and possibly some     numbness. These will likely resolve over time. If you see  hair-like  strings coming out of your     incisions, do not pull them (this will only cause pain/discomfort). We will trim them when you come     back for your follow-up appointment.  5. Call Us If:   - You see any areas on your leg that are red and angry in appearance.   - You notice any  drainage that is milky or cloudy in appearance or that has a foul odor.   - You run a temperature of 100.5 or greater.    Post-Op Day Three:  You will have a follow up appointment 2-4 days post-procedure. At this appointment, you will have an ultrasound and we will check your incisions.    ________________________________________________________________________________________    The Two Weeks Following Your Procedure  1.  Skin Care   - Do not use any lotions, creams or powders on your incisions for 14 days or until the incisions have                 healed.   - Do not soak in a bathtub, hot tub or go swimming for 14 days or until your incisions have healed.  2.  Medications   - You may use ibuprofen or acetaminophen (e.g., Tylenol) as needed for pain or discomfort.  3.  Activity   - Do not lift over 25 pounds. After about two weeks you may resume exercise such as aerobics,                 running, tennis or weightlifting. Use your common sense and ease back into your exercise routine                 slowly.   - You may feel a cord-like tightness along the inside of your leg. Gentle stretching can be helpful.  4. Compression Hose   - Your doctor may instruct you to wear compression for longer than seven days; please follow your                 doctor's instructions. As a comfort measure, you may choose to wear compression for longer than                 required.  5.  Travel   - Do not fly in an airplane for 14 days after your procedure. If you have a long car trip planned within                 two to three weeks following your procedure, stop and walk for a few minutes every two hours.      Periodic ankle pumps during the ride may be helpful.    Six Week Appointment  - At your six-week appointment, you will see your surgeon for an exam and evaluation. This office visit     will be scheduled when you return for post-op day three return appointment.       Return to Work  1.  If you work outside the home, you may  return to work in a few days depending on the extent of your        procedure, how you tolerate it, and the type of work you perform.  2.  Paperwork: If your employer requires paperwork or you would like a letter written to your employer, please        let us know. We will complete disability type forms at no charge. Please allow five business days for forms        to be completed.

## 2025-07-07 NOTE — LETTER
7/7/2025      Khloe Smith  [address]  [place] [address]      Dear Colleague,    Thank you for referring your patient, Khloe Smith, to the [facility] [place]. Please see a copy of my visit note below.     [facility]: [place]    Khloe Smith is a follow-up appointment after being seen on 6/30/2025.  Successful closure of the left LSV 5/13/2015 with incompetent GSV at that time.  For several years but over the last 2 to 3 years with increasing left leg painful varicosities despite trial of compression.    -- 7/3/2025 left leg venous duplex: No deep venous insufficiency.  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  LSV not visualized.  Normal accessory saphenous vein.  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  I sent her images of the incompetent greater saphenous vein for primary reason of her symptomatic varicosities.  It also communicates with the Giacomini vein and I am not sure how significant this is contributing.    Symptoms despite aggressive compression therapy I would recommend surgical treatment.  In the clinic under light oral sedation like we have done for her lesser saphenous vein and tumescent anesthetic we closed the greater saphenous vein from the junction at least to the proximal calf.  If we can avoid closing more distally this would avoid the potential of numbness to the greater saphenous nerve that I discussed today and previously.    Would evaluate the Giacomini vein at the same time to see if that should be closed size and length.  Cosmetic phlebectomies would be performed at this may be adequate about closing the Giacomini's was discussed.    Prior procedure this would be performed here in the clinic with compression for 72 hours and  follow-up duplex at that time.  Thigh-high compression stockings for approximately 2 weeks and be very cautious with sun exposure.    Since getting  in [place] and with her discomfort she would certainly like to have this performed prior to the wedding which I think we can arrange.    Video call today being completed at 1529 hrs    Lang Tomlin MD         Virtual Visit Details    Type of service:  Video Visit     Originating Location (pt. Location): Home    Distant Location (provider location):  On-site  Platform used for Video Visit: Well      Again, thank you for allowing me to participate in the care of your patient.        Sincerely,        Lang Tomlin MD    Electronically signed

## 2025-07-07 NOTE — PROGRESS NOTES
Virtual Visit Details    Type of service:  Video Visit     Originating Location (pt. Location): Home    Distant Location (provider location):  On-site  Platform used for Video Visit: Leeann

## 2025-07-17 DIAGNOSIS — I83.812 VARICOSE VEINS OF LEG WITH PAIN, LEFT: Primary | ICD-10-CM

## 2025-07-29 DIAGNOSIS — I83.812 VARICOSE VEINS OF LEG WITH PAIN, LEFT: Primary | ICD-10-CM

## 2025-07-31 RX ORDER — LORAZEPAM 1 MG/1
TABLET ORAL
Qty: 3 TABLET | Refills: 0 | Status: SHIPPED | OUTPATIENT
Start: 2025-07-31

## 2025-07-31 RX ORDER — CLONIDINE HYDROCHLORIDE 0.1 MG/1
TABLET ORAL
Qty: 1 TABLET | Refills: 0 | Status: SHIPPED | OUTPATIENT
Start: 2025-07-31

## 2025-08-10 ENCOUNTER — TELEPHONE (OUTPATIENT)
Dept: OTHER | Facility: CLINIC | Age: 50
End: 2025-08-10
Payer: COMMERCIAL

## 2025-08-11 ENCOUNTER — ALLIED HEALTH/NURSE VISIT (OUTPATIENT)
Dept: VASCULAR SURGERY | Facility: CLINIC | Age: 50
End: 2025-08-11
Attending: SURGERY
Payer: COMMERCIAL

## 2025-08-11 ENCOUNTER — ANCILLARY PROCEDURE (OUTPATIENT)
Dept: ULTRASOUND IMAGING | Facility: CLINIC | Age: 50
End: 2025-08-11
Attending: SURGERY
Payer: COMMERCIAL

## 2025-08-11 DIAGNOSIS — I83.812 VARICOSE VEINS OF LEG WITH PAIN, LEFT: ICD-10-CM

## 2025-08-11 DIAGNOSIS — Z09 POSTOP CHECK: Primary | ICD-10-CM

## 2025-08-11 PROCEDURE — 93971 EXTREMITY STUDY: CPT | Mod: LT | Performed by: SURGERY

## 2025-08-11 PROCEDURE — 99207 PR NO CHARGE NURSE ONLY: CPT

## (undated) RX ORDER — FENTANYL CITRATE 50 UG/ML
INJECTION, SOLUTION INTRAMUSCULAR; INTRAVENOUS
Status: DISPENSED
Start: 2021-04-26